# Patient Record
Sex: MALE | Race: BLACK OR AFRICAN AMERICAN | NOT HISPANIC OR LATINO | Employment: FULL TIME | URBAN - METROPOLITAN AREA
[De-identification: names, ages, dates, MRNs, and addresses within clinical notes are randomized per-mention and may not be internally consistent; named-entity substitution may affect disease eponyms.]

---

## 2023-02-22 LAB
ALANINE AMINOTRANSFERASE (SGPT) (U/L) IN SER/PLAS: 31 U/L (ref 10–52)
ALBUMIN (G/DL) IN SER/PLAS: 4.2 G/DL (ref 3.4–5)
ALKALINE PHOSPHATASE (U/L) IN SER/PLAS: 61 U/L (ref 33–136)
ANION GAP IN SER/PLAS: 14 MMOL/L (ref 10–20)
ASPARTATE AMINOTRANSFERASE (SGOT) (U/L) IN SER/PLAS: 24 U/L (ref 9–39)
BASOPHILS (10*3/UL) IN BLOOD BY AUTOMATED COUNT: 0.02 X10E9/L (ref 0–0.1)
BASOPHILS/100 LEUKOCYTES IN BLOOD BY AUTOMATED COUNT: 0.6 % (ref 0–2)
BILIRUBIN TOTAL (MG/DL) IN SER/PLAS: 0.3 MG/DL (ref 0–1.2)
CALCIUM (MG/DL) IN SER/PLAS: 8.8 MG/DL (ref 8.6–10.6)
CARBON DIOXIDE, TOTAL (MMOL/L) IN SER/PLAS: 22 MMOL/L (ref 21–32)
CD3+CD4+ ABSOLUTE: 0.84 X10E9/L (ref 0.35–2.74)
CD3+CD8+ ABSOLUTE: 0.61 X10E9/L (ref 0.08–1.49)
CD4/CD8 RATIO: 1.38 (ref 1–3.5)
CD45%: 100 %
CHLORIDE (MMOL/L) IN SER/PLAS: 109 MMOL/L (ref 98–107)
CHOLESTEROL (MG/DL) IN SER/PLAS: 181 MG/DL (ref 0–199)
CHOLESTEROL IN HDL (MG/DL) IN SER/PLAS: 36.3 MG/DL
CHOLESTEROL/HDL RATIO: 5
CP CD3+CD4+%: 51 % (ref 29–57)
CP CD3+CD8+%: 37 % (ref 7–31)
CREATININE (MG/DL) IN SER/PLAS: 1.48 MG/DL (ref 0.5–1.3)
EOSINOPHILS (10*3/UL) IN BLOOD BY AUTOMATED COUNT: 0.14 X10E9/L (ref 0–0.4)
EOSINOPHILS/100 LEUKOCYTES IN BLOOD BY AUTOMATED COUNT: 4.2 % (ref 0–6)
ERYTHROCYTE DISTRIBUTION WIDTH (RATIO) BY AUTOMATED COUNT: 14.8 % (ref 11.5–14.5)
ERYTHROCYTE MEAN CORPUSCULAR HEMOGLOBIN CONCENTRATION (G/DL) BY AUTOMATED: 30.2 G/DL (ref 32–36)
ERYTHROCYTE MEAN CORPUSCULAR VOLUME (FL) BY AUTOMATED COUNT: 86 FL (ref 80–100)
ERYTHROCYTES (10*6/UL) IN BLOOD BY AUTOMATED COUNT: 6.07 X10E12/L (ref 4.5–5.9)
FMETH: ABNORMAL
FSIT1: ABNORMAL
GFR MALE: 49 ML/MIN/1.73M2
GLUCOSE (MG/DL) IN SER/PLAS: 124 MG/DL (ref 74–99)
HEMATOCRIT (%) IN BLOOD BY AUTOMATED COUNT: 52.3 % (ref 41–52)
HEMOGLOBIN (G/DL) IN BLOOD: 15.8 G/DL (ref 13.5–17.5)
IMMATURE GRANULOCYTES/100 LEUKOCYTES IN BLOOD BY AUTOMATED COUNT: 0.3 % (ref 0–0.9)
LDL: 115 MG/DL (ref 0–99)
LEUKOCYTES (10*3/UL) IN BLOOD BY AUTOMATED COUNT: 3.4 X10E9/L (ref 4.4–11.3)
LYMPHOCYTES (10*3/UL) IN BLOOD BY AUTOMATED COUNT: 1.64 X10E9/L (ref 0.8–3)
LYMPHOCYTES/100 LEUKOCYTES IN BLOOD BY AUTOMATED COUNT: 48.8 % (ref 13–44)
MONOCYTES (10*3/UL) IN BLOOD BY AUTOMATED COUNT: 0.48 X10E9/L (ref 0.05–0.8)
MONOCYTES/100 LEUKOCYTES IN BLOOD BY AUTOMATED COUNT: 14.3 % (ref 2–10)
NEUTROPHILS (10*3/UL) IN BLOOD BY AUTOMATED COUNT: 1.07 X10E9/L (ref 1.6–5.5)
NEUTROPHILS/100 LEUKOCYTES IN BLOOD BY AUTOMATED COUNT: 31.8 % (ref 40–80)
NRBC (PER 100 WBCS) BY AUTOMATED COUNT: 0 /100 WBC (ref 0–0)
PLATELETS (10*3/UL) IN BLOOD AUTOMATED COUNT: 204 X10E9/L (ref 150–450)
POTASSIUM (MMOL/L) IN SER/PLAS: 4.4 MMOL/L (ref 3.5–5.3)
PROTEIN TOTAL: 7.2 G/DL (ref 6.4–8.2)
SODIUM (MMOL/L) IN SER/PLAS: 141 MMOL/L (ref 136–145)
SYPHILIS TOTAL AB: REACTIVE
TRIGLYCERIDE (MG/DL) IN SER/PLAS: 147 MG/DL (ref 0–149)
UREA NITROGEN (MG/DL) IN SER/PLAS: 20 MG/DL (ref 6–23)
VLDL: 29 MG/DL (ref 0–40)

## 2023-02-23 LAB
CHLAMYDIA TRACH., AMPLIFIED: NEGATIVE
HIV-1 RNA PCR VIRAL LOAD LOG: NORMAL LOG10 CPY/ML
HIV-1 RNA VIRAL LOAD: NOT DETECTED COPIES/ML
N. GONORRHEA, AMPLIFIED: NEGATIVE
VDRL: NONREACTIVE

## 2023-02-24 LAB
RPR: ABNORMAL
SYPHILIS INTEGRATED INTERPRETATION: ABNORMAL
SYPHILIS TOTAL AB: REACTIVE
TREPONEMA PALLIDUM CONFIRMATION: REACTIVE

## 2023-04-05 LAB — URINE CULTURE: NO GROWTH

## 2023-05-05 ENCOUNTER — HOSPITAL ENCOUNTER (OUTPATIENT)
Dept: DATA CONVERSION | Facility: HOSPITAL | Age: 73
End: 2023-05-05
Attending: INTERNAL MEDICINE | Admitting: INTERNAL MEDICINE
Payer: MEDICARE

## 2023-05-05 DIAGNOSIS — I47.20 VENTRICULAR TACHYCARDIA, UNSPECIFIED (MULTI): ICD-10-CM

## 2023-05-05 DIAGNOSIS — Z95.810 PRESENCE OF AUTOMATIC (IMPLANTABLE) CARDIAC DEFIBRILLATOR: ICD-10-CM

## 2023-05-05 DIAGNOSIS — I42.8 OTHER CARDIOMYOPATHIES (MULTI): ICD-10-CM

## 2023-05-05 DIAGNOSIS — Z45.02 ENCOUNTER FOR ADJUSTMENT AND MANAGEMENT OF AUTOMATIC IMPLANTABLE CARDIAC DEFIBRILLATOR: ICD-10-CM

## 2023-05-05 DIAGNOSIS — I50.9 HEART FAILURE, UNSPECIFIED (MULTI): ICD-10-CM

## 2023-05-05 LAB — POCT GLUCOSE: 72 MG/DL (ref 74–99)

## 2023-05-09 ENCOUNTER — APPOINTMENT (OUTPATIENT)
Dept: LAB | Facility: LAB | Age: 73
End: 2023-05-09
Payer: MEDICARE

## 2023-05-09 LAB
ERYTHROCYTE DISTRIBUTION WIDTH (RATIO) BY AUTOMATED COUNT: 14.9 % (ref 11.5–14.5)
ERYTHROCYTE MEAN CORPUSCULAR HEMOGLOBIN CONCENTRATION (G/DL) BY AUTOMATED: 31.5 G/DL (ref 32–36)
ERYTHROCYTE MEAN CORPUSCULAR VOLUME (FL) BY AUTOMATED COUNT: 85 FL (ref 80–100)
ERYTHROCYTES (10*6/UL) IN BLOOD BY AUTOMATED COUNT: 6.22 X10E12/L (ref 4.5–5.9)
HEMATOCRIT (%) IN BLOOD BY AUTOMATED COUNT: 53.1 % (ref 41–52)
HEMOGLOBIN (G/DL) IN BLOOD: 16.7 G/DL (ref 13.5–17.5)
LEUKOCYTES (10*3/UL) IN BLOOD BY AUTOMATED COUNT: 4.2 X10E9/L (ref 4.4–11.3)
NATRIURETIC PEPTIDE B (PG/ML) IN SER/PLAS: 94 PG/ML (ref 0–99)
NRBC (PER 100 WBCS) BY AUTOMATED COUNT: 0 /100 WBC (ref 0–0)
PLATELETS (10*3/UL) IN BLOOD AUTOMATED COUNT: 215 X10E9/L (ref 150–450)

## 2023-05-10 LAB
ALBUMIN (G/DL) IN SER/PLAS: 4.3 G/DL (ref 3.4–5)
ANION GAP IN SER/PLAS: 14 MMOL/L (ref 10–20)
CALCIUM (MG/DL) IN SER/PLAS: 9.9 MG/DL (ref 8.6–10.6)
CARBON DIOXIDE, TOTAL (MMOL/L) IN SER/PLAS: 25 MMOL/L (ref 21–32)
CHLORIDE (MMOL/L) IN SER/PLAS: 108 MMOL/L (ref 98–107)
CREATININE (MG/DL) IN SER/PLAS: 1.86 MG/DL (ref 0.5–1.3)
FERRITIN (UG/LL) IN SER/PLAS: 695 UG/L (ref 20–300)
GFR MALE: 38 ML/MIN/1.73M2
GLUCOSE (MG/DL) IN SER/PLAS: 116 MG/DL (ref 74–99)
IRON (UG/DL) IN SER/PLAS: 43 UG/DL (ref 35–150)
IRON BINDING CAPACITY (UG/DL) IN SER/PLAS: 304 UG/DL (ref 240–445)
IRON SATURATION (%) IN SER/PLAS: 14 % (ref 25–45)
PHOSPHATE (MG/DL) IN SER/PLAS: 3.9 MG/DL (ref 2.5–4.9)
POTASSIUM (MMOL/L) IN SER/PLAS: 4.6 MMOL/L (ref 3.5–5.3)
SODIUM (MMOL/L) IN SER/PLAS: 142 MMOL/L (ref 136–145)
UREA NITROGEN (MG/DL) IN SER/PLAS: 22 MG/DL (ref 6–23)

## 2023-05-11 LAB — SARS-COV-2 RESULT: NOT DETECTED

## 2023-07-24 ENCOUNTER — APPOINTMENT (OUTPATIENT)
Dept: LAB | Facility: LAB | Age: 73
End: 2023-07-24
Payer: MEDICARE

## 2023-07-24 LAB
ALANINE AMINOTRANSFERASE (SGPT) (U/L) IN SER/PLAS: 17 U/L (ref 10–52)
ALBUMIN (G/DL) IN SER/PLAS: 4.6 G/DL (ref 3.4–5)
ALBUMIN (MG/L) IN URINE: 1110.9 MG/L
ALBUMIN/CREATININE (UG/MG) IN URINE: 1313.1 UG/MG CRT (ref 0–30)
ALKALINE PHOSPHATASE (U/L) IN SER/PLAS: 61 U/L (ref 33–136)
ANION GAP IN SER/PLAS: 13 MMOL/L (ref 10–20)
ASPARTATE AMINOTRANSFERASE (SGOT) (U/L) IN SER/PLAS: 21 U/L (ref 9–39)
BILIRUBIN TOTAL (MG/DL) IN SER/PLAS: 0.4 MG/DL (ref 0–1.2)
C REACTIVE PROTEIN (MG/L) IN SER/PLAS: 0.13 MG/DL
CALCIUM (MG/DL) IN SER/PLAS: 10 MG/DL (ref 8.6–10.6)
CARBON DIOXIDE, TOTAL (MMOL/L) IN SER/PLAS: 25 MMOL/L (ref 21–32)
CHLORIDE (MMOL/L) IN SER/PLAS: 105 MMOL/L (ref 98–107)
CHOLESTEROL (MG/DL) IN SER/PLAS: 189 MG/DL (ref 0–199)
CHOLESTEROL IN HDL (MG/DL) IN SER/PLAS: 43.8 MG/DL
CHOLESTEROL/HDL RATIO: 4.3
COBALAMIN (VITAMIN B12) (PG/ML) IN SER/PLAS: 549 PG/ML (ref 211–911)
CREATININE (MG/DL) IN SER/PLAS: 1.21 MG/DL (ref 0.5–1.3)
CREATININE (MG/DL) IN URINE: 84.6 MG/DL (ref 20–370)
ERYTHROCYTE DISTRIBUTION WIDTH (RATIO) BY AUTOMATED COUNT: 14.3 % (ref 11.5–14.5)
ERYTHROCYTE MEAN CORPUSCULAR HEMOGLOBIN CONCENTRATION (G/DL) BY AUTOMATED: 30.9 G/DL (ref 32–36)
ERYTHROCYTE MEAN CORPUSCULAR VOLUME (FL) BY AUTOMATED COUNT: 88 FL (ref 80–100)
ERYTHROCYTES (10*6/UL) IN BLOOD BY AUTOMATED COUNT: 5.75 X10E12/L (ref 4.5–5.9)
ESTIMATED AVERAGE GLUCOSE FOR HBA1C: 169 MG/DL
FERRITIN (UG/LL) IN SER/PLAS: 630 UG/L (ref 20–300)
GFR MALE: 63 ML/MIN/1.73M2
GLUCOSE (MG/DL) IN SER/PLAS: 95 MG/DL (ref 74–99)
HEMATOCRIT (%) IN BLOOD BY AUTOMATED COUNT: 50.5 % (ref 41–52)
HEMOGLOBIN (G/DL) IN BLOOD: 15.6 G/DL (ref 13.5–17.5)
HEMOGLOBIN A1C/HEMOGLOBIN TOTAL IN BLOOD: 7.5 %
IRON (UG/DL) IN SER/PLAS: 73 UG/DL (ref 35–150)
IRON BINDING CAPACITY (UG/DL) IN SER/PLAS: 360 UG/DL (ref 240–445)
IRON SATURATION (%) IN SER/PLAS: 20 % (ref 25–45)
LDL: 109 MG/DL (ref 0–99)
LEUKOCYTES (10*3/UL) IN BLOOD BY AUTOMATED COUNT: 3.4 X10E9/L (ref 4.4–11.3)
NATRIURETIC PEPTIDE B (PG/ML) IN SER/PLAS: 58 PG/ML (ref 0–99)
NRBC (PER 100 WBCS) BY AUTOMATED COUNT: 0 /100 WBC (ref 0–0)
PHOSPHATE (MG/DL) IN SER/PLAS: 3.1 MG/DL (ref 2.5–4.9)
PLATELETS (10*3/UL) IN BLOOD AUTOMATED COUNT: 240 X10E9/L (ref 150–450)
POTASSIUM (MMOL/L) IN SER/PLAS: 4.8 MMOL/L (ref 3.5–5.3)
PROTEIN TOTAL: 7.5 G/DL (ref 6.4–8.2)
SEDIMENTATION RATE, ERYTHROCYTE: 16 MM/H (ref 0–20)
SODIUM (MMOL/L) IN SER/PLAS: 138 MMOL/L (ref 136–145)
THYROTROPIN (MIU/L) IN SER/PLAS BY DETECTION LIMIT <= 0.05 MIU/L: 2.43 MIU/L (ref 0.44–3.98)
TRIGLYCERIDE (MG/DL) IN SER/PLAS: 181 MG/DL (ref 0–149)
UREA NITROGEN (MG/DL) IN SER/PLAS: 17 MG/DL (ref 6–23)
VLDL: 36 MG/DL (ref 0–40)

## 2023-09-01 PROBLEM — E11.3293 TYPE 2 DIABETES MELLITUS WITH MILD NONPROLIFERATIVE RETINOPATHY OF BOTH EYES WITHOUT MACULAR EDEMA (MULTI): Status: ACTIVE | Noted: 2023-09-01

## 2023-09-01 PROBLEM — I47.20 PAROXYSMAL VT: Status: ACTIVE | Noted: 2023-09-01

## 2023-09-01 PROBLEM — H73.10: Status: ACTIVE | Noted: 2023-09-01

## 2023-09-01 PROBLEM — N52.9 MALE ERECTILE DISORDER: Status: ACTIVE | Noted: 2023-09-01

## 2023-09-01 PROBLEM — H90.A21 SENSORINEURAL HEARING LOSS (SNHL) OF RIGHT EAR WITH RESTRICTED HEARING OF LEFT EAR: Status: ACTIVE | Noted: 2023-09-01

## 2023-09-01 PROBLEM — J34.2 DEVIATED NASAL SEPTUM: Status: ACTIVE | Noted: 2023-09-01

## 2023-09-01 PROBLEM — E11.9 TYPE II DIABETES MELLITUS (MULTI): Status: ACTIVE | Noted: 2019-06-27

## 2023-09-01 PROBLEM — R20.0 NUMBNESS AND TINGLING IN BOTH HANDS: Status: ACTIVE | Noted: 2023-09-01

## 2023-09-01 PROBLEM — H25.13 AGE-RELATED NUCLEAR CATARACT OF BOTH EYES: Status: ACTIVE | Noted: 2023-09-01

## 2023-09-01 PROBLEM — M06.9 RA (RHEUMATOID ARTHRITIS) (MULTI): Status: ACTIVE | Noted: 2022-06-08

## 2023-09-01 PROBLEM — I50.22: Status: ACTIVE | Noted: 2023-09-01

## 2023-09-01 PROBLEM — I51.7 LVH (LEFT VENTRICULAR HYPERTROPHY): Status: ACTIVE | Noted: 2023-09-01

## 2023-09-01 PROBLEM — I47.29 PAROXYSMAL VT (MULTI): Status: ACTIVE | Noted: 2023-09-01

## 2023-09-01 PROBLEM — I10 HTN (HYPERTENSION): Status: ACTIVE | Noted: 2023-09-01

## 2023-09-01 PROBLEM — M70.60 GREATER TROCHANTERIC BURSITIS: Status: ACTIVE | Noted: 2023-09-01

## 2023-09-01 PROBLEM — H52.209 MYOPIA WITH ASTIGMATISM AND PRESBYOPIA: Status: ACTIVE | Noted: 2023-09-01

## 2023-09-01 PROBLEM — Q66.71 CAVUS DEFORMITY OF RIGHT FOOT: Status: ACTIVE | Noted: 2023-09-01

## 2023-09-01 PROBLEM — R39.89 ABNORMAL PROSTATE EXAM: Status: ACTIVE | Noted: 2023-09-01

## 2023-09-01 PROBLEM — R09.89 HEMODYNAMIC INSTABILITY: Status: ACTIVE | Noted: 2023-09-01

## 2023-09-01 PROBLEM — I25.10 ARTERIOSCLEROSIS OF CORONARY ARTERY: Status: ACTIVE | Noted: 2023-09-01

## 2023-09-01 PROBLEM — I25.5 GENERALIZED ISCHEMIC MYOCARDIAL DYSFUNCTION: Status: ACTIVE | Noted: 2023-09-01

## 2023-09-01 PROBLEM — D12.6 TUBULAR ADENOMA OF COLON: Status: ACTIVE | Noted: 2023-09-01

## 2023-09-01 PROBLEM — J34.3 HYPERTROPHY OF BOTH INFERIOR NASAL TURBINATES: Status: ACTIVE | Noted: 2023-09-01

## 2023-09-01 PROBLEM — E66.01 MORBID OBESITY (MULTI): Status: ACTIVE | Noted: 2023-09-01

## 2023-09-01 PROBLEM — R51.9 HEADACHE: Status: ACTIVE | Noted: 2023-09-01

## 2023-09-01 PROBLEM — H90.A32 MIXED CONDUCTIVE AND SENSORINEURAL HEARING LOSS OF LEFT EAR WITH RESTRICTED HEARING OF RIGHT EAR: Status: ACTIVE | Noted: 2023-09-01

## 2023-09-01 PROBLEM — R09.89 THROAT CLEARING: Status: ACTIVE | Noted: 2023-09-01

## 2023-09-01 PROBLEM — F41.8 DEPRESSION WITH ANXIETY: Status: ACTIVE | Noted: 2023-09-01

## 2023-09-01 PROBLEM — H25.11 AGE-RELATED NUCLEAR CATARACT OF RIGHT EYE: Status: ACTIVE | Noted: 2023-09-01

## 2023-09-01 PROBLEM — R39.198 OTHER DIFFICULTIES WITH MICTURITION: Status: ACTIVE | Noted: 2023-09-01

## 2023-09-01 PROBLEM — M54.12 CERVICAL RADICULITIS: Status: ACTIVE | Noted: 2023-09-01

## 2023-09-01 PROBLEM — I73.9 PAD (PERIPHERAL ARTERY DISEASE) (CMS-HCC): Status: ACTIVE | Noted: 2023-09-01

## 2023-09-01 PROBLEM — G89.29 PAIN, CHRONIC: Status: ACTIVE | Noted: 2022-06-08

## 2023-09-01 PROBLEM — E78.5 HYPERLIPIDEMIA: Status: ACTIVE | Noted: 2023-09-01

## 2023-09-01 PROBLEM — C61 PROSTATE CANCER (MULTI): Status: ACTIVE | Noted: 2023-09-01

## 2023-09-01 PROBLEM — T83.490A MALFUNCTION OF PENILE PROSTHESIS (CMS-HCC): Status: ACTIVE | Noted: 2023-09-01

## 2023-09-01 PROBLEM — R06.00 DYSPNEA: Status: ACTIVE | Noted: 2023-09-01

## 2023-09-01 PROBLEM — I70.213 ATHEROSCLEROSIS OF NATIVE ARTERY OF BOTH LOWER EXTREMITIES WITH INTERMITTENT CLAUDICATION (CMS-HCC): Status: ACTIVE | Noted: 2023-09-01

## 2023-09-01 PROBLEM — J30.9 ALLERGIC RHINITIS: Status: ACTIVE | Noted: 2023-09-01

## 2023-09-01 PROBLEM — K21.9 GASTROESOPHAGEAL REFLUX DISEASE: Status: ACTIVE | Noted: 2022-06-08

## 2023-09-01 PROBLEM — Z96.0 S/P INSERTION OF PENILE IMPLANT: Status: ACTIVE | Noted: 2023-09-01

## 2023-09-01 PROBLEM — I10 BENIGN ESSENTIAL HYPERTENSION: Status: ACTIVE | Noted: 2023-09-01

## 2023-09-01 PROBLEM — M54.16 LUMBAR RADICULITIS: Status: ACTIVE | Noted: 2023-09-01

## 2023-09-01 PROBLEM — M47.812 DJD (DEGENERATIVE JOINT DISEASE) OF CERVICAL SPINE: Status: ACTIVE | Noted: 2023-09-01

## 2023-09-01 PROBLEM — Z85.46 HISTORY OF MALIGNANT NEOPLASM OF PROSTATE: Status: ACTIVE | Noted: 2019-11-19

## 2023-09-01 PROBLEM — Z95.810 CARDIAC DEFIBRILLATOR IN PLACE: Status: ACTIVE | Noted: 2023-09-01

## 2023-09-01 PROBLEM — I47.29 VENTRICULAR TACHYCARDIA, NON-SUSTAINED (MULTI): Status: ACTIVE | Noted: 2023-09-01

## 2023-09-01 PROBLEM — I11.0 HYPERTENSIVE HEART DISEASE WITH HEART FAILURE (MULTI): Status: ACTIVE | Noted: 2019-11-19
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
## 2023-09-01 PROBLEM — Z86.79 HISTORY OF CARDIOMYOPATHY: Status: ACTIVE | Noted: 2023-09-01

## 2023-09-01 PROBLEM — Z98.61 CORONARY ANGIOPLASTY STATUS: Status: ACTIVE | Noted: 2023-09-01

## 2023-09-01 PROBLEM — B20: Status: ACTIVE | Noted: 2023-09-01

## 2023-09-01 PROBLEM — I48.3 TYPICAL ATRIAL FLUTTER (MULTI): Status: ACTIVE | Noted: 2023-09-01

## 2023-09-01 PROBLEM — H74.02 TYMPANOSCLEROSIS OF LEFT EAR: Status: ACTIVE | Noted: 2023-09-01

## 2023-09-01 PROBLEM — I42.9 CARDIOMYOPATHY (MULTI): Status: ACTIVE | Noted: 2023-09-01

## 2023-09-01 PROBLEM — M43.10 SPONDYLOLISTHESIS: Status: ACTIVE | Noted: 2023-09-01

## 2023-09-01 PROBLEM — H52.4 MYOPIA WITH ASTIGMATISM AND PRESBYOPIA: Status: ACTIVE | Noted: 2023-09-01

## 2023-09-01 PROBLEM — R31.0 GROSS HEMATURIA: Status: ACTIVE | Noted: 2023-09-01

## 2023-09-01 PROBLEM — M72.2 PLANTAR FASCIITIS, RIGHT: Status: ACTIVE | Noted: 2023-09-01

## 2023-09-01 PROBLEM — T83.89XA: Status: ACTIVE | Noted: 2023-09-01

## 2023-09-01 PROBLEM — E83.52 HYPERCALCEMIA: Status: ACTIVE | Noted: 2023-09-01

## 2023-09-01 PROBLEM — R20.2 NUMBNESS AND TINGLING IN BOTH HANDS: Status: ACTIVE | Noted: 2023-09-01

## 2023-09-01 PROBLEM — H52.10 MYOPIA WITH ASTIGMATISM AND PRESBYOPIA: Status: ACTIVE | Noted: 2023-09-01

## 2023-09-01 PROBLEM — H93.13 TINNITUS OF BOTH EARS: Status: ACTIVE | Noted: 2023-09-01

## 2023-09-01 PROBLEM — I49.5 SINOATRIAL NODE DYSFUNCTION (MULTI): Status: ACTIVE | Noted: 2023-09-01

## 2023-09-01 PROBLEM — S61.219A LACERATION OF FINGER: Status: ACTIVE | Noted: 2019-11-19

## 2023-09-01 PROBLEM — B20 HIV DISEASE (MULTI): Status: ACTIVE | Noted: 2023-09-01

## 2023-09-01 PROBLEM — I50.9 CONGESTIVE HEART FAILURE (MULTI): Status: ACTIVE | Noted: 2019-11-19

## 2023-09-01 PROBLEM — E11.3299 NPDR (NONPROLIFERATIVE DIABETIC RETINOPATHY) (MULTI): Status: ACTIVE | Noted: 2023-09-01

## 2023-09-01 PROBLEM — M79.18 MUSCULOSKELETAL PAIN: Status: ACTIVE | Noted: 2023-09-01

## 2023-09-01 PROBLEM — R32 URINARY INCONTINENCE: Status: ACTIVE | Noted: 2023-09-01

## 2023-09-01 PROBLEM — K63.5 POLYP OF COLON: Status: ACTIVE | Noted: 2023-09-01

## 2023-09-01 PROBLEM — Z96.0 STATUS POST IMPLANTATION OF ARTIFICIAL URINARY SPHINCTER: Status: ACTIVE | Noted: 2023-09-01

## 2023-09-01 PROBLEM — I48.91 ATRIAL FIBRILLATION (MULTI): Status: ACTIVE | Noted: 2019-11-19

## 2023-09-01 PROBLEM — M54.30 SCIATICA: Status: ACTIVE | Noted: 2023-09-01

## 2023-09-01 PROBLEM — E55.9 VITAMIN D DEFICIENCY: Status: ACTIVE | Noted: 2023-09-01

## 2023-09-01 PROBLEM — M65.4 DE QUERVAIN'S TENOSYNOVITIS: Status: ACTIVE | Noted: 2023-09-01

## 2023-09-01 PROBLEM — M48.062 LUMBAR STENOSIS WITH NEUROGENIC CLAUDICATION: Status: ACTIVE | Noted: 2023-09-01

## 2023-09-01 PROBLEM — I50.41 ACUTE COMBINED SYSTOLIC AND DIASTOLIC CONGESTIVE HEART FAILURE (MULTI): Status: ACTIVE | Noted: 2023-09-01

## 2023-09-01 PROBLEM — Z21 HUMAN IMMUNODEFICIENCY VIRUS (HIV) INFECTION: Status: ACTIVE | Noted: 2023-09-01

## 2023-09-01 RX ORDER — EPLERENONE 25 MG/1
1 TABLET, FILM COATED ORAL DAILY
COMMUNITY
Start: 2016-05-18 | End: 2023-11-03 | Stop reason: SDUPTHER

## 2023-09-01 RX ORDER — EZETIMIBE 10 MG/1
1 TABLET ORAL NIGHTLY
COMMUNITY
Start: 2021-11-10 | End: 2024-03-13 | Stop reason: SDUPTHER

## 2023-09-01 RX ORDER — OMEGA-3-ACID ETHYL ESTERS 1 G/1
1 CAPSULE, LIQUID FILLED ORAL DAILY
COMMUNITY
End: 2023-11-03 | Stop reason: SDUPTHER

## 2023-09-01 RX ORDER — HYDRALAZINE HYDROCHLORIDE 10 MG/1
TABLET, FILM COATED ORAL
COMMUNITY
Start: 2023-07-12 | End: 2023-11-03 | Stop reason: SDUPTHER

## 2023-09-01 RX ORDER — INSULIN GLARGINE 100 [IU]/ML
25 INJECTION, SOLUTION SUBCUTANEOUS NIGHTLY
COMMUNITY
End: 2024-03-13 | Stop reason: SDUPTHER

## 2023-09-01 RX ORDER — ESCITALOPRAM OXALATE 20 MG/1
1 TABLET ORAL DAILY
COMMUNITY
Start: 2019-11-12 | End: 2024-03-13 | Stop reason: ALTCHOICE

## 2023-09-01 RX ORDER — METOPROLOL SUCCINATE 200 MG/1
1 TABLET, EXTENDED RELEASE ORAL DAILY
COMMUNITY
Start: 2016-05-18 | End: 2023-11-21 | Stop reason: WASHOUT

## 2023-09-01 RX ORDER — TROSPIUM CHLORIDE ER 60 MG/1
1 CAPSULE ORAL DAILY
COMMUNITY
Start: 2023-07-18 | End: 2024-03-13 | Stop reason: ALTCHOICE

## 2023-09-01 RX ORDER — BLOOD SUGAR DIAGNOSTIC
1 STRIP MISCELLANEOUS 2 TIMES DAILY
COMMUNITY
Start: 2019-05-17

## 2023-09-01 RX ORDER — GLIPIZIDE 10 MG/1
10 TABLET ORAL
COMMUNITY
End: 2024-03-13 | Stop reason: ALTCHOICE

## 2023-09-01 RX ORDER — LANCETS 33 GAUGE
EACH MISCELLANEOUS
COMMUNITY
Start: 2023-06-17

## 2023-09-01 RX ORDER — ATORVASTATIN CALCIUM 40 MG/1
1 TABLET, FILM COATED ORAL DAILY
COMMUNITY
End: 2024-03-13 | Stop reason: WASHOUT

## 2023-09-01 RX ORDER — SERTRALINE HYDROCHLORIDE 50 MG/1
TABLET, FILM COATED ORAL
COMMUNITY
Start: 2020-10-01 | End: 2023-12-12 | Stop reason: WASHOUT

## 2023-09-01 RX ORDER — RIBOFLAVIN (VITAMIN B2) 400 MG
1 TABLET ORAL DAILY
COMMUNITY
End: 2023-11-03 | Stop reason: SDUPTHER

## 2023-09-01 RX ORDER — METOPROLOL SUCCINATE 25 MG/1
25 TABLET, EXTENDED RELEASE ORAL DAILY
COMMUNITY
End: 2023-11-21 | Stop reason: WASHOUT

## 2023-09-01 RX ORDER — PEN NEEDLE, DIABETIC 31 GX5/16"
NEEDLE, DISPOSABLE MISCELLANEOUS
COMMUNITY
Start: 2023-07-03

## 2023-09-01 RX ORDER — GABAPENTIN 100 MG/1
CAPSULE ORAL
COMMUNITY
Start: 2021-12-21 | End: 2024-03-13 | Stop reason: ALTCHOICE

## 2023-09-01 RX ORDER — SACUBITRIL AND VALSARTAN 49; 51 MG/1; MG/1
TABLET, FILM COATED ORAL
COMMUNITY
Start: 2022-10-10 | End: 2024-03-13 | Stop reason: ALTCHOICE

## 2023-09-01 RX ORDER — AMLODIPINE BESYLATE 10 MG/1
1 TABLET ORAL DAILY
COMMUNITY
Start: 2018-03-15 | End: 2024-03-13 | Stop reason: ALTCHOICE

## 2023-09-01 RX ORDER — APIXABAN 5 MG/1
1 TABLET, FILM COATED ORAL 2 TIMES DAILY
COMMUNITY
Start: 2023-06-28 | End: 2023-11-21 | Stop reason: WASHOUT

## 2023-09-01 RX ORDER — LISINOPRIL 20 MG/1
1 TABLET ORAL DAILY
COMMUNITY
End: 2023-11-21 | Stop reason: WASHOUT

## 2023-09-01 RX ORDER — METFORMIN HYDROCHLORIDE 500 MG/1
1 TABLET, EXTENDED RELEASE ORAL
COMMUNITY
Start: 2022-05-13 | End: 2024-03-13 | Stop reason: ALTCHOICE

## 2023-09-01 RX ORDER — GLIPIZIDE 5 MG/1
1 TABLET ORAL EVERY MORNING
COMMUNITY
End: 2024-03-13 | Stop reason: ALTCHOICE

## 2023-09-01 RX ORDER — AMLODIPINE BESYLATE 5 MG/1
1 TABLET ORAL DAILY
COMMUNITY
End: 2024-03-13 | Stop reason: ALTCHOICE

## 2023-09-01 RX ORDER — VIT C/E/ZN/COPPR/LUTEIN/ZEAXAN 250MG-90MG
1 CAPSULE ORAL DAILY
COMMUNITY
Start: 2018-06-11 | End: 2024-03-13 | Stop reason: ALTCHOICE

## 2023-09-01 RX ORDER — EMTRICITABINE AND TENOFOVIR ALAFENAMIDE 200; 25 MG/1; MG/1
1 TABLET ORAL DAILY
COMMUNITY
Start: 2018-06-11 | End: 2023-11-03 | Stop reason: SDUPTHER

## 2023-09-01 RX ORDER — CHOLECALCIFEROL (VITAMIN D3) 50 MCG
1 TABLET ORAL
COMMUNITY
End: 2024-03-13 | Stop reason: ALTCHOICE

## 2023-09-01 RX ORDER — ISOSORBIDE DINITRATE 10 MG/1
1 TABLET ORAL 3 TIMES DAILY
COMMUNITY
Start: 2023-07-12 | End: 2023-11-03 | Stop reason: SDUPTHER

## 2023-09-01 RX ORDER — CLOPIDOGREL BISULFATE 75 MG/1
1 TABLET ORAL DAILY
COMMUNITY
Start: 2015-10-06 | End: 2023-11-21 | Stop reason: SDUPTHER

## 2023-09-01 RX ORDER — SACUBITRIL AND VALSARTAN 97; 103 MG/1; MG/1
1 TABLET, FILM COATED ORAL 2 TIMES DAILY
COMMUNITY
Start: 2022-05-11 | End: 2023-11-21 | Stop reason: SDUPTHER

## 2023-09-01 RX ORDER — INSULIN GLARGINE 100 [IU]/ML
INJECTION, SOLUTION SUBCUTANEOUS
COMMUNITY
Start: 2020-04-24

## 2023-09-01 RX ORDER — ASPIRIN 81 MG/1
1 TABLET ORAL DAILY
COMMUNITY
Start: 2022-04-22 | End: 2024-03-13 | Stop reason: ALTCHOICE

## 2023-09-01 RX ORDER — EMPAGLIFLOZIN 10 MG/1
1 TABLET, FILM COATED ORAL DAILY
COMMUNITY
Start: 2022-04-22 | End: 2023-11-21 | Stop reason: WASHOUT

## 2023-09-01 RX ORDER — DOCUSATE SODIUM 100 MG/1
1 CAPSULE, LIQUID FILLED ORAL 2 TIMES DAILY
COMMUNITY
Start: 2019-01-04 | End: 2024-03-13 | Stop reason: ALTCHOICE

## 2023-09-13 LAB
ALANINE AMINOTRANSFERASE (SGPT) (U/L) IN SER/PLAS: 59 U/L (ref 10–52)
ALBUMIN (G/DL) IN SER/PLAS: 4.4 G/DL (ref 3.4–5)
ALKALINE PHOSPHATASE (U/L) IN SER/PLAS: 69 U/L (ref 33–136)
ANION GAP IN SER/PLAS: 14 MMOL/L (ref 10–20)
ASPARTATE AMINOTRANSFERASE (SGOT) (U/L) IN SER/PLAS: 31 U/L (ref 9–39)
BASOPHILS (10*3/UL) IN BLOOD BY AUTOMATED COUNT: 0.01 X10E9/L (ref 0–0.1)
BASOPHILS/100 LEUKOCYTES IN BLOOD BY AUTOMATED COUNT: 0.3 % (ref 0–2)
BILIRUBIN TOTAL (MG/DL) IN SER/PLAS: 0.4 MG/DL (ref 0–1.2)
CALCIUM (MG/DL) IN SER/PLAS: 9.9 MG/DL (ref 8.6–10.6)
CARBON DIOXIDE, TOTAL (MMOL/L) IN SER/PLAS: 26 MMOL/L (ref 21–32)
CHLORIDE (MMOL/L) IN SER/PLAS: 101 MMOL/L (ref 98–107)
CREATININE (MG/DL) IN SER/PLAS: 1.58 MG/DL (ref 0.5–1.3)
EOSINOPHILS (10*3/UL) IN BLOOD BY AUTOMATED COUNT: 0.06 X10E9/L (ref 0–0.4)
EOSINOPHILS/100 LEUKOCYTES IN BLOOD BY AUTOMATED COUNT: 1.8 % (ref 0–6)
ERYTHROCYTE DISTRIBUTION WIDTH (RATIO) BY AUTOMATED COUNT: 15 % (ref 11.5–14.5)
ERYTHROCYTE MEAN CORPUSCULAR HEMOGLOBIN CONCENTRATION (G/DL) BY AUTOMATED: 32.6 G/DL (ref 32–36)
ERYTHROCYTE MEAN CORPUSCULAR VOLUME (FL) BY AUTOMATED COUNT: 86 FL (ref 80–100)
ERYTHROCYTES (10*6/UL) IN BLOOD BY AUTOMATED COUNT: 6.12 X10E12/L (ref 4.5–5.9)
GFR MALE: 46 ML/MIN/1.73M2
GLUCOSE (MG/DL) IN SER/PLAS: 199 MG/DL (ref 74–99)
HEMATOCRIT (%) IN BLOOD BY AUTOMATED COUNT: 52.4 % (ref 41–52)
HEMOGLOBIN (G/DL) IN BLOOD: 17.1 G/DL (ref 13.5–17.5)
IMMATURE GRANULOCYTES/100 LEUKOCYTES IN BLOOD BY AUTOMATED COUNT: 0.3 % (ref 0–0.9)
LEUKOCYTES (10*3/UL) IN BLOOD BY AUTOMATED COUNT: 3.4 X10E9/L (ref 4.4–11.3)
LYMPHOCYTES (10*3/UL) IN BLOOD BY AUTOMATED COUNT: 2.09 X10E9/L (ref 0.8–3)
LYMPHOCYTES/100 LEUKOCYTES IN BLOOD BY AUTOMATED COUNT: 62.2 % (ref 13–44)
MONOCYTES (10*3/UL) IN BLOOD BY AUTOMATED COUNT: 0.29 X10E9/L (ref 0.05–0.8)
MONOCYTES/100 LEUKOCYTES IN BLOOD BY AUTOMATED COUNT: 8.6 % (ref 2–10)
NEUTROPHILS (10*3/UL) IN BLOOD BY AUTOMATED COUNT: 0.9 X10E9/L (ref 1.6–5.5)
NEUTROPHILS/100 LEUKOCYTES IN BLOOD BY AUTOMATED COUNT: 26.8 % (ref 40–80)
NRBC (PER 100 WBCS) BY AUTOMATED COUNT: 0 /100 WBC (ref 0–0)
PLATELETS (10*3/UL) IN BLOOD AUTOMATED COUNT: 218 X10E9/L (ref 150–450)
POTASSIUM (MMOL/L) IN SER/PLAS: 4.9 MMOL/L (ref 3.5–5.3)
PROTEIN TOTAL: 7.7 G/DL (ref 6.4–8.2)
SODIUM (MMOL/L) IN SER/PLAS: 136 MMOL/L (ref 136–145)
UREA NITROGEN (MG/DL) IN SER/PLAS: 20 MG/DL (ref 6–23)

## 2023-09-14 LAB
CD3+CD4+ ABSOLUTE: 1.02 X10E9/L (ref 0.35–2.74)
CD3+CD8+ ABSOLUTE: 0.75 X10E9/L (ref 0.08–1.49)
CD4/CD8 RATIO: 1.36 (ref 1–3.5)
CD45%: 100 %
CP CD3+CD4+%: 49 % (ref 29–57)
CP CD3+CD8+%: 36 % (ref 7–31)
FMETH: ABNORMAL
FSIT1: ABNORMAL
HIV-1 RNA PCR VIRAL LOAD LOG: ABNORMAL LOG10 CPY/ML
HIV-1 RNA VIRAL LOAD: ABNORMAL COPIES/ML

## 2023-09-14 NOTE — H&P
History of Present Illness:   History Present Illness:  Reason for surgery: Pulse Generator SERAFIN   HPI:    The patient is 72 years old male with past medical history significant for heart failure, nonischemic cardiomyopathy status post [place] dual-chamber ICD is here  for elective generator change out    Allergies:        Intolerances:  ·  ritonavir : Taste altered    Home Medication Review:   Home Medications Reviewed: yes     Impression/Procedure:   ·  Impression and Planned Procedure: Pulse generator SERAFIN, will proceed with elective generator change out       ERAS (Enhanced Recovery After Surgery):  ·  ERAS Patient: no       Physical Exam by System:    Constitutional: Well developed, awake/alert/oriented  x3, no distress, alert and cooperative   Respiratory/Thorax: Normal work of breathing   Cardiovascular: Regular rate and rhythm   Extremities: normal extremities, no cyanosis edema,  contusions or wounds, no clubbing   Neurological: alert and oriented x3   Psychological: Appropriate mood and behavior   Skin: Warm and dry, no lesions, no rashes     Airway/Sedation Assessment:  ·  Emotional Status calm   ·  Neurologic alert & oriented x 3   ·  Cardiovascular rhythm & rate regular   ·  GI/ soft, nontender     · Pulses present: Radial Left, Radial Right     ·  Mouth Opening OK yes   ·  Neck Flexibility OK yes   ·  Loose Teeth no   ·  Oropharyngeal Classification Class III   ·  ASA PS Classification ASA IV   ·  Sedation Plan moderate sedation     Consent:   COVID-19 Consent:  ·  COVID-19 Risk Consent Surgeon has reviewed key risks related to the risk of crystal COVID-19 and if they contract COVID-19 what the risks are.     Attestation:   Note Completion:  I am a:  Resident/Fellow   Attending Attestation I saw and evaluated the patient.  I personally obtained the key and critical portions of the history and physical exam or was physically present for key and  critical portions performed by the  resident/fellow. I reviewed the resident/fellow?s documentation and discussed the patient with the resident/fellow.  I agree with the resident/fellow?s medical decision making as documented in the note.     I personally evaluated the patient on 05-May-2023         Electronic Signatures:  Sahil Capellan)  (Signed 09-May-2023 09:58)   Authored: Note Completion   Co-Signer: History of Present Illness, Allergies, Home Medication Review, Impression/Procedure, ERAS, Physical Exam, Consent, Note Completion  Joon Kwok (Fellow))  (Signed 05-May-2023 14:22)   Authored: History of Present Illness, Allergies, Home  Medication Review, Impression/Procedure, ERAS, Physical Exam, Consent, Note Completion      Last Updated: 09-May-2023 09:58 by Sahil Capellan)
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
## 2023-10-13 DIAGNOSIS — B20 HUMAN IMMUNODEFICIENCY VIRUS (MULTI): Primary | ICD-10-CM

## 2023-10-19 DIAGNOSIS — I47.20 VT (VENTRICULAR TACHYCARDIA) (MULTI): Primary | ICD-10-CM

## 2023-10-19 DIAGNOSIS — Z95.810 PRESENCE OF AUTOMATIC CARDIOVERTER/DEFIBRILLATOR (AICD): ICD-10-CM

## 2023-10-20 ENCOUNTER — PHARMACY VISIT (OUTPATIENT)
Dept: PHARMACY | Facility: CLINIC | Age: 73
End: 2023-10-20
Payer: MEDICARE

## 2023-10-20 PROCEDURE — RXMED WILLOW AMBULATORY MEDICATION CHARGE

## 2023-10-24 ENCOUNTER — PHARMACY VISIT (OUTPATIENT)
Dept: PHARMACY | Facility: CLINIC | Age: 73
End: 2023-10-24
Payer: MEDICARE

## 2023-10-24 PROCEDURE — RXMED WILLOW AMBULATORY MEDICATION CHARGE

## 2023-11-01 ENCOUNTER — PHARMACY VISIT (OUTPATIENT)
Dept: PHARMACY | Facility: CLINIC | Age: 73
End: 2023-11-01
Payer: MEDICARE

## 2023-11-01 PROCEDURE — RXMED WILLOW AMBULATORY MEDICATION CHARGE

## 2023-11-03 ENCOUNTER — PHARMACY VISIT (OUTPATIENT)
Dept: PHARMACY | Facility: CLINIC | Age: 73
End: 2023-11-03
Payer: MEDICARE

## 2023-11-03 ENCOUNTER — OFFICE VISIT (OUTPATIENT)
Dept: NEPHROLOGY | Facility: CLINIC | Age: 73
End: 2023-11-03
Payer: MEDICARE

## 2023-11-03 VITALS
HEIGHT: 66 IN | TEMPERATURE: 97.4 F | DIASTOLIC BLOOD PRESSURE: 72 MMHG | BODY MASS INDEX: 34.55 KG/M2 | WEIGHT: 215 LBS | HEART RATE: 78 BPM | SYSTOLIC BLOOD PRESSURE: 109 MMHG

## 2023-11-03 DIAGNOSIS — R80.9 PROTEINURIA, UNSPECIFIED TYPE: ICD-10-CM

## 2023-11-03 DIAGNOSIS — I10 BENIGN ESSENTIAL HYPERTENSION: ICD-10-CM

## 2023-11-03 DIAGNOSIS — N18.31 TYPE 2 DIABETES MELLITUS WITH STAGE 3A CHRONIC KIDNEY DISEASE, UNSPECIFIED WHETHER LONG TERM INSULIN USE (MULTI): ICD-10-CM

## 2023-11-03 DIAGNOSIS — N18.30 STAGE 3 CHRONIC KIDNEY DISEASE, UNSPECIFIED WHETHER STAGE 3A OR 3B CKD (MULTI): Primary | ICD-10-CM

## 2023-11-03 DIAGNOSIS — I50.22 HEART FAILURE, SYSTOLIC, CHRONIC (MULTI): ICD-10-CM

## 2023-11-03 DIAGNOSIS — E11.22 TYPE 2 DIABETES MELLITUS WITH STAGE 3A CHRONIC KIDNEY DISEASE, UNSPECIFIED WHETHER LONG TERM INSULIN USE (MULTI): ICD-10-CM

## 2023-11-03 PROCEDURE — 3062F POS MACROALBUMINURIA REV: CPT | Performed by: NURSE PRACTITIONER

## 2023-11-03 PROCEDURE — 3008F BODY MASS INDEX DOCD: CPT | Performed by: NURSE PRACTITIONER

## 2023-11-03 PROCEDURE — 3078F DIAST BP <80 MM HG: CPT | Performed by: NURSE PRACTITIONER

## 2023-11-03 PROCEDURE — 3051F HG A1C>EQUAL 7.0%<8.0%: CPT | Performed by: NURSE PRACTITIONER

## 2023-11-03 PROCEDURE — 1159F MED LIST DOCD IN RCRD: CPT | Performed by: NURSE PRACTITIONER

## 2023-11-03 PROCEDURE — 99204 OFFICE O/P NEW MOD 45 MIN: CPT | Performed by: NURSE PRACTITIONER

## 2023-11-03 PROCEDURE — RXMED WILLOW AMBULATORY MEDICATION CHARGE

## 2023-11-03 PROCEDURE — 3074F SYST BP LT 130 MM HG: CPT | Performed by: NURSE PRACTITIONER

## 2023-11-03 PROCEDURE — 3066F NEPHROPATHY DOC TX: CPT | Performed by: NURSE PRACTITIONER

## 2023-11-03 PROCEDURE — 1036F TOBACCO NON-USER: CPT | Performed by: NURSE PRACTITIONER

## 2023-11-03 PROCEDURE — 1126F AMNT PAIN NOTED NONE PRSNT: CPT | Performed by: NURSE PRACTITIONER

## 2023-11-09 ENCOUNTER — PHARMACY VISIT (OUTPATIENT)
Dept: PHARMACY | Facility: CLINIC | Age: 73
End: 2023-11-09
Payer: MEDICARE

## 2023-11-09 ENCOUNTER — LAB (OUTPATIENT)
Dept: LAB | Facility: LAB | Age: 73
End: 2023-11-09
Payer: MEDICARE

## 2023-11-09 DIAGNOSIS — N18.30 STAGE 3 CHRONIC KIDNEY DISEASE, UNSPECIFIED WHETHER STAGE 3A OR 3B CKD (MULTI): ICD-10-CM

## 2023-11-09 LAB
25(OH)D3 SERPL-MCNC: 41 NG/ML (ref 30–100)
ANION GAP SERPL CALC-SCNC: 13 MMOL/L (ref 10–20)
BUN SERPL-MCNC: 27 MG/DL (ref 6–23)
CALCIUM SERPL-MCNC: 10.1 MG/DL (ref 8.6–10.6)
CHLORIDE SERPL-SCNC: 107 MMOL/L (ref 98–107)
CO2 SERPL-SCNC: 25 MMOL/L (ref 21–32)
CREAT SERPL-MCNC: 1.52 MG/DL (ref 0.5–1.3)
CREAT UR-MCNC: 68.2 MG/DL (ref 20–370)
GFR SERPL CREATININE-BSD FRML MDRD: 48 ML/MIN/1.73M*2
GLUCOSE SERPL-MCNC: 88 MG/DL (ref 74–99)
MICROALBUMIN UR-MCNC: 760.6 MG/L
MICROALBUMIN/CREAT UR: 1115.2 UG/MG CREAT
POTASSIUM SERPL-SCNC: 4.4 MMOL/L (ref 3.5–5.3)
PTH-INTACT SERPL-MCNC: 65.4 PG/ML (ref 18.5–88)
SODIUM SERPL-SCNC: 141 MMOL/L (ref 136–145)

## 2023-11-09 PROCEDURE — 36415 COLL VENOUS BLD VENIPUNCTURE: CPT

## 2023-11-09 PROCEDURE — 82043 UR ALBUMIN QUANTITATIVE: CPT

## 2023-11-09 PROCEDURE — RXMED WILLOW AMBULATORY MEDICATION CHARGE

## 2023-11-09 PROCEDURE — 83970 ASSAY OF PARATHORMONE: CPT

## 2023-11-09 PROCEDURE — 80048 BASIC METABOLIC PNL TOTAL CA: CPT

## 2023-11-09 PROCEDURE — 82306 VITAMIN D 25 HYDROXY: CPT

## 2023-11-09 PROCEDURE — 82570 ASSAY OF URINE CREATININE: CPT

## 2023-11-21 ENCOUNTER — PHARMACY VISIT (OUTPATIENT)
Dept: PHARMACY | Facility: CLINIC | Age: 73
End: 2023-11-21
Payer: MEDICARE

## 2023-11-21 ENCOUNTER — OFFICE VISIT (OUTPATIENT)
Dept: CARDIOLOGY | Facility: CLINIC | Age: 73
End: 2023-11-21
Payer: MEDICARE

## 2023-11-21 VITALS
HEART RATE: 66 BPM | DIASTOLIC BLOOD PRESSURE: 72 MMHG | SYSTOLIC BLOOD PRESSURE: 124 MMHG | WEIGHT: 212 LBS | OXYGEN SATURATION: 96 % | BODY MASS INDEX: 34.07 KG/M2 | HEIGHT: 66 IN

## 2023-11-21 DIAGNOSIS — I48.91 ATRIAL FIBRILLATION, UNSPECIFIED TYPE (MULTI): ICD-10-CM

## 2023-11-21 DIAGNOSIS — I50.22 HEART FAILURE, SYSTOLIC, CHRONIC (MULTI): Primary | ICD-10-CM

## 2023-11-21 DIAGNOSIS — R29.818 SUSPECTED SLEEP APNEA: ICD-10-CM

## 2023-11-21 PROCEDURE — 1036F TOBACCO NON-USER: CPT | Performed by: STUDENT IN AN ORGANIZED HEALTH CARE EDUCATION/TRAINING PROGRAM

## 2023-11-21 PROCEDURE — 3051F HG A1C>EQUAL 7.0%<8.0%: CPT | Performed by: STUDENT IN AN ORGANIZED HEALTH CARE EDUCATION/TRAINING PROGRAM

## 2023-11-21 PROCEDURE — 3078F DIAST BP <80 MM HG: CPT | Performed by: STUDENT IN AN ORGANIZED HEALTH CARE EDUCATION/TRAINING PROGRAM

## 2023-11-21 PROCEDURE — 1126F AMNT PAIN NOTED NONE PRSNT: CPT | Performed by: STUDENT IN AN ORGANIZED HEALTH CARE EDUCATION/TRAINING PROGRAM

## 2023-11-21 PROCEDURE — 3062F POS MACROALBUMINURIA REV: CPT | Performed by: STUDENT IN AN ORGANIZED HEALTH CARE EDUCATION/TRAINING PROGRAM

## 2023-11-21 PROCEDURE — RXMED WILLOW AMBULATORY MEDICATION CHARGE

## 2023-11-21 PROCEDURE — 3008F BODY MASS INDEX DOCD: CPT | Performed by: STUDENT IN AN ORGANIZED HEALTH CARE EDUCATION/TRAINING PROGRAM

## 2023-11-21 PROCEDURE — 99215 OFFICE O/P EST HI 40 MIN: CPT | Performed by: STUDENT IN AN ORGANIZED HEALTH CARE EDUCATION/TRAINING PROGRAM

## 2023-11-21 PROCEDURE — 1159F MED LIST DOCD IN RCRD: CPT | Performed by: STUDENT IN AN ORGANIZED HEALTH CARE EDUCATION/TRAINING PROGRAM

## 2023-11-21 PROCEDURE — 3066F NEPHROPATHY DOC TX: CPT | Performed by: STUDENT IN AN ORGANIZED HEALTH CARE EDUCATION/TRAINING PROGRAM

## 2023-11-21 PROCEDURE — 3074F SYST BP LT 130 MM HG: CPT | Performed by: STUDENT IN AN ORGANIZED HEALTH CARE EDUCATION/TRAINING PROGRAM

## 2023-11-21 RX ORDER — METOPROLOL SUCCINATE 200 MG/1
200 TABLET, EXTENDED RELEASE ORAL DAILY
Qty: 90 TABLET | Refills: 3 | Status: SHIPPED | OUTPATIENT
Start: 2023-11-21 | End: 2024-11-20

## 2023-11-21 RX ORDER — ISOSORBIDE DINITRATE 10 MG/1
10 TABLET ORAL 3 TIMES DAILY
Qty: 270 TABLET | Refills: 3 | Status: SHIPPED | OUTPATIENT
Start: 2023-11-21 | End: 2024-11-20

## 2023-11-21 RX ORDER — CLOPIDOGREL BISULFATE 75 MG/1
75 TABLET ORAL DAILY
Qty: 90 TABLET | Refills: 3 | Status: SHIPPED | OUTPATIENT
Start: 2023-11-21

## 2023-11-21 RX ORDER — EPLERENONE 25 MG/1
25 TABLET, FILM COATED ORAL DAILY
Qty: 90 TABLET | Refills: 3 | Status: SHIPPED | OUTPATIENT
Start: 2023-11-21 | End: 2024-11-20

## 2023-11-21 RX ORDER — EZETIMIBE 10 MG/1
10 TABLET ORAL NIGHTLY
Qty: 90 TABLET | Refills: 3 | Status: SHIPPED | OUTPATIENT
Start: 2023-11-21 | End: 2024-11-20

## 2023-11-21 RX ORDER — SACUBITRIL AND VALSARTAN 97; 103 MG/1; MG/1
1 TABLET, FILM COATED ORAL 2 TIMES DAILY
Qty: 180 TABLET | Refills: 3 | Status: SHIPPED | OUTPATIENT
Start: 2023-11-21

## 2023-11-21 RX ORDER — HYDRALAZINE HYDROCHLORIDE 10 MG/1
10 TABLET, FILM COATED ORAL 3 TIMES DAILY
Qty: 270 TABLET | Refills: 3 | Status: SHIPPED | OUTPATIENT
Start: 2023-11-21 | End: 2024-11-20

## 2023-11-21 ASSESSMENT — ENCOUNTER SYMPTOMS
WEIGHT LOSS: 0
PALPITATIONS: 0
HEMATEMESIS: 0
COLOR CHANGE: 0
HALLUCINATIONS: 1
HEMATURIA: 1
FEVER: 0
CLAUDICATION: 0
SPUTUM PRODUCTION: 0
HEMATOCHEZIA: 0
NEAR-SYNCOPE: 0
LOSS OF BALANCE: 0
ABDOMINAL PAIN: 1
SHORTNESS OF BREATH: 1
DYSPNEA ON EXERTION: 0
DIFFICULTY WITH CONCENTRATION: 1
DECREASED APPETITE: 0
SYNCOPE: 0
IRREGULAR HEARTBEAT: 0
WEIGHT GAIN: 0
ALTERED MENTAL STATUS: 0
COUGH: 0
ORTHOPNEA: 0
PND: 0
WHEEZING: 0
WEAKNESS: 0
FOCAL WEAKNESS: 0

## 2023-11-21 NOTE — PATIENT INSTRUCTIONS
Thank you for coming in today. If you have any questions or concerns, you may call the Heart Failure Office at [phone] option [phone], or [phone].  You may also contact our heart failure nursing team via email on [email or web address].    For quicker results set-up your  [facility] account to receive results and other correspondence directly to your phone.    Please bring all your pills/medications to your Cardiology appointments.    **  - No new medication changes today    -We will renew your heart medications today    - Please have the following tests done:  1.Blood tests before your next visit  (RFP, BNP, lipids)    2.  Echocardiogram before your next visit in 6 months.  We can schedule this for you today before you leave, or you may CALL  Tel [phone]   OR    [phone] to schedule      You will be referred to the following teams, CALL  [phone] to schedule your appointments with:  1. Sleep medicine. You may also CALL [phone] ([phone]) to  schedule with Sleep Medicine    - Please make an appointment to be seen in 6 to 7 months

## 2023-11-21 NOTE — PROGRESS NOTES
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  -Cardiac rehabilitation referral  -CPET in October 2023  -We will advance hydralazine/isosorbide dinitrate at next visit  -Heart failure lifestyle modifications discussed and Qs answered.     -Medication optimisation:  BB: Continue metoprolol succinate 200 mg once daily  RAASi: Continue sacubitril/valsartan to 97/103 mg twice daily  AA: Continue eplerenone 25 mg once daily  SGLT2i: Continue empagliflozin 10 mg once daily  Hydralazine: Continue 10 mg 3 times daily  Isosorbide dinitrate: Continue 10 mg 3 times daily    #Chest discomfort, Indeterminate nuclear pharmacological stress test 8/2022  -Evaluation has been negative for cardiac etiology, if he has continued symptoms after his neurological work-up, we will plan for myocardial PET rest stress study  -Continue clopidogrel  -Continue Ezetimibe    #Atrial flutter, new onset 2023 status post ablation 5/2023  -Continue to  hold aspirin  -Continue apixaban 5 mg twice daily  -Post ablation fatigue and mild SOB on exertion ,TTE 6/2023 reviewed    #Exertional leg pain -resolved  Will check PVR/MIKE if symptoms recur    #Iron deficiency  - IV  mg IV qweekly x 2 weeks -prescribed  -Encouraged today to increase iron content of diet, will recheck levels prior to next visit    #Suspected sleep apnea  Referred to the sleep medicine team again today    This note was transcribed using the Dragon Dictation system. There may be grammatical, punctuation, or verbiage errors that can occur with voice recognition programs.

## 2023-11-24 ENCOUNTER — APPOINTMENT (OUTPATIENT)
Dept: CARDIOLOGY | Facility: CLINIC | Age: 73
End: 2023-11-24
Payer: MEDICARE

## 2023-11-25 ENCOUNTER — PHARMACY VISIT (OUTPATIENT)
Dept: PHARMACY | Facility: CLINIC | Age: 73
End: 2023-11-25
Payer: MEDICARE

## 2023-11-25 PROCEDURE — RXMED WILLOW AMBULATORY MEDICATION CHARGE

## 2023-11-26 ASSESSMENT — ENCOUNTER SYMPTOMS
RESPIRATORY NEGATIVE: 1
EYES NEGATIVE: 1
CONSTITUTIONAL NEGATIVE: 1
PSYCHIATRIC NEGATIVE: 1
HEMATOLOGIC/LYMPHATIC NEGATIVE: 1
GASTROINTESTINAL NEGATIVE: 1
NEUROLOGICAL NEGATIVE: 1
ENDOCRINE NEGATIVE: 1
MUSCULOSKELETAL NEGATIVE: 1

## 2023-11-26 NOTE — PROGRESS NOTES
History Of Present Illness  Thompson Carranza is a 73 y.o. male with medical history significant for CKD, proteinuria, DMII, HTN, HFrEF, CAD s/p PCI to LAD (10/2021), s/p dual-chamber defibrillator placement, HLD, a-flutter s/p ablation (5/2023), HIV (on HAART), and prostate ca s/p RALP (2011) who presents for an initial evaluation for CKD and proteinuria.     Past Medical History  As above.    Surgical History  He has a past surgical history that includes Other surgical history (04/10/2014); Other surgical history (04/10/2014); Other surgical history (04/10/2014); Colonoscopy (04/10/2014); and Prostatectomy (11/14/2022).     Social History  He reports that he quit smoking about 15 years ago. His smoking use included cigarettes. He has never used smokeless tobacco. No history on file for alcohol use and drug use.    Family History  Family History   Problem Relation Name Age of Onset    Alcohol abuse Mother      Depression Mother      Diabetes Mother      Alcohol abuse Father      Stroke Father      Anxiety disorder Sister      Coronary artery disease Sister          multiple vessel    Diabetes Sister      Dementia Sister          senile    Dementia Brother      Dementia Paternal Cousin          Allergies  Peanut, Fluoride toothpaste, and Ritonavir    Review of Systems   Constitutional: Negative.    HENT: Negative.     Eyes: Negative.    Respiratory: Negative.     Cardiovascular:  Positive for leg swelling.        Chronic two-pillow orthopnea   Gastrointestinal: Negative.    Endocrine: Negative.    Genitourinary: Negative.    Musculoskeletal: Negative.    Skin: Negative.    Neurological: Negative.    Hematological: Negative.    Psychiatric/Behavioral: Negative.          Physical Exam  Constitutional:       Appearance: Normal appearance.   HENT:      Head: Normocephalic and atraumatic.      Mouth/Throat:      Mouth: Mucous membranes are moist.   Cardiovascular:      Rate and Rhythm: Normal rate and regular rhythm.       "Pulses: Normal pulses.      Heart sounds: Normal heart sounds.      Comments: Trace BLE edema, chronic  Pulmonary:      Effort: Pulmonary effort is normal.      Breath sounds: Normal breath sounds.   Musculoskeletal:         General: Normal range of motion.   Skin:     General: Skin is warm and dry.   Neurological:      General: No focal deficit present.      Mental Status: He is alert and oriented to person, place, and time.   Psychiatric:         Mood and Affect: Mood normal.         Behavior: Behavior normal.         Thought Content: Thought content normal.         Judgment: Judgment normal.          Last Recorded Vitals  Blood pressure 109/72, pulse 78, temperature 36.3 °C (97.4 °F), temperature source Temporal, height 1.676 m (5' 6\"), weight 97.5 kg (215 lb).    Relevant Results  Recent Results (from the past 3360 hour(s))   Phosphorus    Collection Time: 07/24/23  5:37 PM   Result Value Ref Range    Phosphorus 3.1 2.5 - 4.9 mg/dL   TSH with reflex to Free T4 if abnormal    Collection Time: 07/24/23  5:37 PM   Result Value Ref Range    TSH 2.43 0.44 - 3.98 mIU/L   Lipid Panel    Collection Time: 07/24/23  5:37 PM   Result Value Ref Range    Cholesterol 189 0 - 199 mg/dL    HDL 43.8 mg/dL    Cholesterol/HDL Ratio 4.3      (H) 0 - 99 mg/dL    VLDL 36 0 - 40 mg/dL    Triglycerides 181 (H) 0 - 149 mg/dL   Iron and TIBC    Collection Time: 07/24/23  5:37 PM   Result Value Ref Range    Iron 73 35 - 150 ug/dL    TIBC 360 240 - 445 ug/dL    Iron Saturation 20 (L) 25 - 45 %   Hemoglobin A1C    Collection Time: 07/24/23  5:37 PM   Result Value Ref Range    Hemoglobin A1C 7.5 (A) %    Estimated Average Glucose 169 MG/DL   Sedimentation Rate    Collection Time: 07/24/23  5:37 PM   Result Value Ref Range    Sedimentation Rate 16 0 - 20 mm/h   Albumin , Urine Random    Collection Time: 07/24/23  5:37 PM   Result Value Ref Range    ALBUMIN (MG/L) IN URINE 1,110.9 Not Established mg/L    Albumin/Creatine Ratio 1,313.1 " (H) 0.0 - 30.0 ug/mg crt    Creatinine, Urine 84.6 20.0 - 370.0 mg/dL   Ferritin    Collection Time: 07/24/23  5:37 PM   Result Value Ref Range    Ferritin 630 (H) 20 - 300 ug/L   C-Reactive Protein    Collection Time: 07/24/23  5:37 PM   Result Value Ref Range    CRP 0.13 mg/dL   B-Type Natriuretic Peptide    Collection Time: 07/24/23  5:37 PM   Result Value Ref Range    BNP 58 0 - 99 pg/mL   Vitamin B12    Collection Time: 07/24/23  5:37 PM   Result Value Ref Range    Vitamin B-12 549 211 - 911 pg/mL   Comprehensive Metabolic Panel    Collection Time: 07/24/23  5:37 PM   Result Value Ref Range    Glucose 95 74 - 99 mg/dL    Sodium 138 136 - 145 mmol/L    Potassium 4.8 3.5 - 5.3 mmol/L    Chloride 105 98 - 107 mmol/L    Bicarbonate 25 21 - 32 mmol/L    Anion Gap 13 10 - 20 mmol/L    Urea Nitrogen 17 6 - 23 mg/dL    Creatinine 1.21 0.50 - 1.30 mg/dL    GFR MALE 63 >90 mL/min/1.73m2    Calcium 10.0 8.6 - 10.6 mg/dL    Albumin 4.6 3.4 - 5.0 g/dL    Alkaline Phosphatase 61 33 - 136 U/L    Total Protein 7.5 6.4 - 8.2 g/dL    AST 21 9 - 39 U/L    Total Bilirubin 0.4 0.0 - 1.2 mg/dL    ALT (SGPT) 17 10 - 52 U/L   CBC    Collection Time: 07/24/23  5:37 PM   Result Value Ref Range    WBC 3.4 (L) 4.4 - 11.3 x10E9/L    nRBC 0.0 0.0 - 0.0 /100 WBC    RBC 5.75 4.50 - 5.90 x10E12/L    Hemoglobin 15.6 13.5 - 17.5 g/dL    Hematocrit 50.5 41.0 - 52.0 %    MCV 88 80 - 100 fL    MCHC 30.9 (L) 32.0 - 36.0 g/dL    Platelets 240 150 - 450 x10E9/L    RDW 14.3 11.5 - 14.5 %   Electrocardiogram    Collection Time: 08/15/23  9:09 AM   Result Value Ref Range    Ventricular Rate 62 BPM    Atrial Rate 62 BPM    WY Interval 180 ms    QRS Duration 152 ms    QT Interval 428 ms    QTC Calculation(Bazett) 434 ms    R Axis -61 degrees    T Axis -35 degrees    QRS Count 10 beats    Q Onset 229 ms    P Onset 139 ms    P Offset 180 ms    T Offset 443 ms    QTC Fredericia 432 ms   CD4/8    Collection Time: 09/13/23  1:47 PM   Result Value Ref Range     Site BLOOD     CD3+CD4+% 49 29 - 57 %    CD3+CD4+ Absolute 1.024 0.350 - 2.740 x10E9/L    CD3+CD8+% 36 (H) 7 - 31 %    CD3+CD8+ Absolute 0.752 0.080 - 1.490 x10E9/L    CD4/CD8 Ratio 1.36 1.00 - 3.50    CD45% 100 %    Method SEE BELOW    HIV RNA, quantitative, PCR    Collection Time: 09/13/23  1:47 PM   Result Value Ref Range    HIV-1 RNA Viral Load <Quantification (A) COPIES/mL    HIV-1 RNA PCR Viral Load Log NOT CALCULATED log10 cpy/mL   Comprehensive Metabolic Panel    Collection Time: 09/13/23  1:47 PM   Result Value Ref Range    Glucose 199 (H) 74 - 99 mg/dL    Sodium 136 136 - 145 mmol/L    Potassium 4.9 3.5 - 5.3 mmol/L    Chloride 101 98 - 107 mmol/L    Bicarbonate 26 21 - 32 mmol/L    Anion Gap 14 10 - 20 mmol/L    Urea Nitrogen 20 6 - 23 mg/dL    Creatinine 1.58 (H) 0.50 - 1.30 mg/dL    GFR MALE 46 (A) >90 mL/min/1.73m2    Calcium 9.9 8.6 - 10.6 mg/dL    Albumin 4.4 3.4 - 5.0 g/dL    Alkaline Phosphatase 69 33 - 136 U/L    Total Protein 7.7 6.4 - 8.2 g/dL    AST 31 9 - 39 U/L    Total Bilirubin 0.4 0.0 - 1.2 mg/dL    ALT (SGPT) 59 (H) 10 - 52 U/L   CBC and Auto Differential    Collection Time: 09/13/23  1:47 PM   Result Value Ref Range    WBC 3.4 (L) 4.4 - 11.3 x10E9/L    nRBC 0.0 0.0 - 0.0 /100 WBC    RBC 6.12 (H) 4.50 - 5.90 x10E12/L    Hemoglobin 17.1 13.5 - 17.5 g/dL    Hematocrit 52.4 (H) 41.0 - 52.0 %    MCV 86 80 - 100 fL    MCHC 32.6 32.0 - 36.0 g/dL    Platelets 218 150 - 450 x10E9/L    RDW 15.0 (H) 11.5 - 14.5 %    Neutrophils % 26.8 40.0 - 80.0 %    Immature Granulocytes %, Automated 0.3 0.0 - 0.9 %    Lymphocytes % 62.2 13.0 - 44.0 %    Monocytes % 8.6 2.0 - 10.0 %    Eosinophils % 1.8 0.0 - 6.0 %    Basophils % 0.3 0.0 - 2.0 %    Neutrophils Absolute 0.90 (L) 1.60 - 5.50 x10E9/L    Lymphocytes Absolute 2.09 0.80 - 3.00 x10E9/L    Monocytes Absolute 0.29 0.05 - 0.80 x10E9/L    Eosinophils Absolute 0.06 0.00 - 0.40 x10E9/L    Basophils Absolute 0.01 0.00 - 0.10 x10E9/L   Albumin , Urine Random     Collection Time: 11/09/23  3:50 PM   Result Value Ref Range    Albumin, Urine Random 760.6 Not established mg/L    Creatinine, Urine Random 68.2 20.0 - 370.0 mg/dL    Albumin/Creatine Ratio 1,115.2 (H) <30.0 ug/mg Creat   Vitamin D 25-Hydroxy,Total (for eval of Vitamin D levels)    Collection Time: 11/09/23  3:50 PM   Result Value Ref Range    Vitamin D, 25-Hydroxy, Total 41 30 - 100 ng/mL   Parathyroid Hormone, Intact    Collection Time: 11/09/23  3:50 PM   Result Value Ref Range    Parathyroid Hormone, Intact 65.4 18.5 - 88.0 pg/mL   Basic Metabolic Panel    Collection Time: 11/09/23  3:50 PM   Result Value Ref Range    Glucose 88 74 - 99 mg/dL    Sodium 141 136 - 145 mmol/L    Potassium 4.4 3.5 - 5.3 mmol/L    Chloride 107 98 - 107 mmol/L    Bicarbonate 25 21 - 32 mmol/L    Anion Gap 13 10 - 20 mmol/L    Urea Nitrogen 27 (H) 6 - 23 mg/dL    Creatinine 1.52 (H) 0.50 - 1.30 mg/dL    eGFR 48 (L) >60 mL/min/1.73m*2    Calcium 10.1 8.6 - 10.6 mg/dL         Assessment/Plan   73 y.o. male with medical history significant for CKD3, proteinuria, DMII, HTN, HFrEF, CAD s/p PCI to LAD (10/2021), s/p dual-chamber defibrillator placement, HLD, a-flutter s/p ablation (5/2023), HIV (on HAART), and prostate ca s/p RALP (2011) who presents for an initial evaluation for CKD and proteinuria.    # CKD3: baseline sCr 1.2 - 1.6 mg/dL with eGFR 46 - 63 ml/min/1.73m2. CT urography (12/29/22) showed simple small renal cysts otherwise unremarkable. Likely due to diabetic nephropathy, hypertensive nephropathy, and cardiorenal physiology. Most recent sCr 1.58 mg/dL (9/13/23) - at baseline. Currently stable.    # Proteinuria: random urine spot test done on 7/24/23 showed albumin 1110.9 mg/L in urine with alb/cr ratio of 1313.1 micrograms/mg, which is significantly worse than the test done on 4/25/23.    # DMII: > 30 years. Most recent HgbA1c 7.5% (7/24/23). Followed by endocrinology.    # HTN: > 30 years. Well controlled with current  regimen.    # HFrEF: EF ~40% (cMRI 2023). Followed by HF clinic.    Plan:  - Labs: BMP, PTH, Vit D; urine spot.  - Continue to follow up with endocrinology for optimal DMII management.  - Continue to follow up with cardiology for optimal HF management.  - Reviewed strategies for preserving remaining kidney function includin) Avoidance of NSAIDs including Aleve (Naprosyn), Motrin (Ibuprofen), Mobic (Meloxicam), Celebrex (Celecoxib) and aspirin as well as PPI acid blocking medications such as Prilosec (omeprazole), Protonix (pantoprazole), and Nexium (esomeprazole), as well as other nephrotoxic agents.   2) Avoidance of tobacco or alcohol use.   3) Adequate hydration daily.   4) Blood pressure target 130/80 mmHg.   5) Daily dietary sodium intake less than 2 grams per day.    6) Maintain healthy lifestyle. Healthy diet and regular exercises.   7) Maintain ideal weight.  - FUV: in 6 months or sooner if concerns arise.     Patient verbalized understanding of above. He will not hesitate to contact Division of Nephrology at [phone] with concerns/questions.    I spent 45 minutes in the professional and overall care of this patient.      Marcello Cano, APRN-CNP

## 2023-11-29 ENCOUNTER — HOSPITAL ENCOUNTER (OUTPATIENT)
Dept: CARDIOLOGY | Facility: CLINIC | Age: 73
Discharge: HOME | End: 2023-11-29
Payer: MEDICARE

## 2023-11-29 DIAGNOSIS — Z95.810 PRESENCE OF AUTOMATIC CARDIOVERTER/DEFIBRILLATOR (AICD): ICD-10-CM

## 2023-11-29 DIAGNOSIS — I47.20 VT (VENTRICULAR TACHYCARDIA) (MULTI): ICD-10-CM

## 2023-11-29 PROCEDURE — 93290 INTERROG DEV EVAL ICPMS IP: CPT | Performed by: INTERNAL MEDICINE

## 2023-11-29 PROCEDURE — 93283 PRGRMG EVAL IMPLANTABLE DFB: CPT

## 2023-11-29 PROCEDURE — 93283 PRGRMG EVAL IMPLANTABLE DFB: CPT | Performed by: INTERNAL MEDICINE

## 2023-12-04 ENCOUNTER — PHARMACY VISIT (OUTPATIENT)
Dept: PHARMACY | Facility: CLINIC | Age: 73
End: 2023-12-04
Payer: MEDICARE

## 2023-12-04 DIAGNOSIS — B20 HUMAN IMMUNODEFICIENCY VIRUS (MULTI): Primary | ICD-10-CM

## 2023-12-04 PROCEDURE — RXMED WILLOW AMBULATORY MEDICATION CHARGE

## 2023-12-05 ENCOUNTER — OFFICE VISIT (OUTPATIENT)
Dept: UROLOGY | Facility: CLINIC | Age: 73
End: 2023-12-05
Payer: MEDICARE

## 2023-12-05 VITALS
BODY MASS INDEX: 35.03 KG/M2 | DIASTOLIC BLOOD PRESSURE: 76 MMHG | HEART RATE: 60 BPM | HEIGHT: 66 IN | SYSTOLIC BLOOD PRESSURE: 121 MMHG | WEIGHT: 218 LBS

## 2023-12-05 DIAGNOSIS — R31.29 OTHER MICROSCOPIC HEMATURIA: ICD-10-CM

## 2023-12-05 DIAGNOSIS — R39.15 URINARY URGENCY: ICD-10-CM

## 2023-12-05 DIAGNOSIS — R32 URINARY INCONTINENCE, UNSPECIFIED TYPE: ICD-10-CM

## 2023-12-05 DIAGNOSIS — T83.89XD: ICD-10-CM

## 2023-12-05 DIAGNOSIS — R82.90 ABNORMAL URINALYSIS: Primary | ICD-10-CM

## 2023-12-05 LAB
MUCOUS THREADS #/AREA URNS AUTO: NORMAL /LPF
POC APPEARANCE, URINE: CLEAR
POC BILIRUBIN, URINE: NEGATIVE
POC BLOOD, URINE: ABNORMAL
POC COLOR, URINE: YELLOW
POC GLUCOSE, URINE: ABNORMAL MG/DL
POC KETONES, URINE: NEGATIVE MG/DL
POC LEUKOCYTES, URINE: NEGATIVE
POC NITRITE,URINE: NEGATIVE
POC PH, URINE: 5 PH
POC PROTEIN, URINE: ABNORMAL MG/DL
POC SPECIFIC GRAVITY, URINE: 1.02
POC UROBILINOGEN, URINE: 0.2 EU/DL
RBC #/AREA URNS AUTO: NORMAL /HPF
WBC #/AREA URNS AUTO: NORMAL /HPF

## 2023-12-05 PROCEDURE — 3074F SYST BP LT 130 MM HG: CPT | Performed by: STUDENT IN AN ORGANIZED HEALTH CARE EDUCATION/TRAINING PROGRAM

## 2023-12-05 PROCEDURE — 81001 URINALYSIS AUTO W/SCOPE: CPT

## 2023-12-05 PROCEDURE — 3008F BODY MASS INDEX DOCD: CPT | Performed by: STUDENT IN AN ORGANIZED HEALTH CARE EDUCATION/TRAINING PROGRAM

## 2023-12-05 PROCEDURE — 3066F NEPHROPATHY DOC TX: CPT | Performed by: STUDENT IN AN ORGANIZED HEALTH CARE EDUCATION/TRAINING PROGRAM

## 2023-12-05 PROCEDURE — 1159F MED LIST DOCD IN RCRD: CPT | Performed by: STUDENT IN AN ORGANIZED HEALTH CARE EDUCATION/TRAINING PROGRAM

## 2023-12-05 PROCEDURE — RXMED WILLOW AMBULATORY MEDICATION CHARGE

## 2023-12-05 PROCEDURE — 3051F HG A1C>EQUAL 7.0%<8.0%: CPT | Performed by: STUDENT IN AN ORGANIZED HEALTH CARE EDUCATION/TRAINING PROGRAM

## 2023-12-05 PROCEDURE — 3062F POS MACROALBUMINURIA REV: CPT | Performed by: STUDENT IN AN ORGANIZED HEALTH CARE EDUCATION/TRAINING PROGRAM

## 2023-12-05 PROCEDURE — 3078F DIAST BP <80 MM HG: CPT | Performed by: STUDENT IN AN ORGANIZED HEALTH CARE EDUCATION/TRAINING PROGRAM

## 2023-12-05 PROCEDURE — 99214 OFFICE O/P EST MOD 30 MIN: CPT | Performed by: STUDENT IN AN ORGANIZED HEALTH CARE EDUCATION/TRAINING PROGRAM

## 2023-12-05 PROCEDURE — 1036F TOBACCO NON-USER: CPT | Performed by: STUDENT IN AN ORGANIZED HEALTH CARE EDUCATION/TRAINING PROGRAM

## 2023-12-05 PROCEDURE — 1126F AMNT PAIN NOTED NONE PRSNT: CPT | Performed by: STUDENT IN AN ORGANIZED HEALTH CARE EDUCATION/TRAINING PROGRAM

## 2023-12-05 PROCEDURE — 1160F RVW MEDS BY RX/DR IN RCRD: CPT | Performed by: STUDENT IN AN ORGANIZED HEALTH CARE EDUCATION/TRAINING PROGRAM

## 2023-12-05 PROCEDURE — 81002 URINALYSIS NONAUTO W/O SCOPE: CPT | Performed by: STUDENT IN AN ORGANIZED HEALTH CARE EDUCATION/TRAINING PROGRAM

## 2023-12-05 RX ORDER — MIRABEGRON 50 MG/1
50 TABLET, EXTENDED RELEASE ORAL DAILY
Qty: 30 TABLET | Refills: 2 | Status: SHIPPED | OUTPATIENT
Start: 2023-12-05

## 2023-12-05 ASSESSMENT — PAIN SCALES - GENERAL: PAINLEVEL: 0-NO PAIN

## 2023-12-05 NOTE — PROGRESS NOTES
Thompson presents for a follow up visit.  The patient’s EMR has been reviewed.  Lives in [place], [place]     H/o HIV, CAD s/p PCI (2021), HFrEF (40% EF preop), T2DM, PAD, prostate Ca s/p RALP (2011) leading to incontinence and ED managed with IPP (2014) and AUS (placed 2012, reactivation in 2014, removal and replacement 2019).     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  S/p explant and re-implant of AUS with me on (05/11/23).  AUS successfully activated (06/26/23).    Developed an episode of A-flutter postoperatively.  Successfully radio-ablated (05/16/23)  Continues to f/u with EP for this.  Continue apixaban for anticoagulation.  Urethral Salcedo removed prior to d/c.    SUBJECTIVE: HPI   TODAY (12/05/23)  Presents today for 6 months follow up visit.   S/p AUS activation (06/26/23).   States that he has persistent post-void dribbling.  Has to wear up to 3x thin pads 2/2 this.   C/o some urinary urgency as well.  Unable to make it to the restroom in time.   Opted to discontinue Trospium 2/2 no benefit.   Has not tried myrbetriq in the past.     TO REVIEW: Last evaluation (06/26/23)  Presents for AUS activation.  Denies any acute or worsening urinary complaints.  Denies any recent UTIs, gross hematuria, flank pain, fevers or chills.     TO REVIEW:   Patient reports the swelling has improved.  Noted some mild bleeding from incision site.   Otherwise no penile discharge, and pain well-controlled.  Denies any fevers, chills.     Past medical, surgical, family and social history in the chart was reviewed and is accurate including any additions to what is in this HPI.    Review of Systems   Constitutional: denies any unintentional weight loss or change in strength.  Integumentary: denies any rashes or pruritus.  Eyes: denies any double vision or eye pain.  Ear/Nose/Mouth/Throat: denies any nosebleeds or gum bleeds.  Cardiovascular: denies any chest pain or syncope.  Respiratory: denies hemoptysis.  Gastrointestinal:  denies nausea or vomiting.  Musculoskeletal: denies muscle cramping or weakness.  Neurologic: denies convulsions or seizures.  Hematologic/Lymphatic: denies bleeding tendencies.  Endocrine: denies heat/cold intolerance.  All other systems have been reviewed and are negative unless otherwise noted in the HPI.    OBJECTIVE:  There were no vitals taken for this visit.  Physical Exam   Constitutional: No obvious distress.  Eyes: Non-injected conjunctiva, sclera clear, EOMI.  Ears/Nose/Mouth/Throat: No obvious drainage per ears or nose.  Cardiovascular: Extremities are warm and well perfused. No edema, cyanosis or pallor.  Respiratory: No audible wheezing/stridor; respirations do not appear labored.  Gastrointestinal: Abdomen soft, not distended.  Musculoskeletal: Normal ROM of extremities.  Skin: No obvious rashes or open sores.  Neurologic: Alert and oriented, CN 2-12 grossly intact.  Psychiatric: Answers questions appropriately with normal affect.  Hematologic/Lymphatic/Immunologic: No obvious bruises or sites of spontaneous bleeding.  Genitourinary: No CVA tenderness, bladder not palpable.     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 H/O Prostate CA - s/p RALP (2011)  2. H/O Urinary incontinence  3. AUS placement   4. HIV  5. CAD  6. HFrEF  7. T2DM    PLAN:  S/p AUS explant and re-implant with me (05/11/23)  AUS activated (06/26/23)    Reports persistent post-void dribbling and urinary urgency associated leakage today.   No benefit with Trospium. Since discontinued.  Continues to wear daily pads, thin pads 3x daily   Opts to trial course of Myrbetriq.   Risks, benefits and alternatives discussed.   RTC in 6-8 weeks for med check.     If no improvement with myrbetriq.   Can consider UDS testing for further evaluation.     All questions were answered to the patient’s satisfaction.  Patient agrees with the plan and wishes to proceed.    Scribed for Dr. Fransico Diallo by Natanael Aldrich.  I, Dr. Fransico Diallo have personally reviewed and agreed with the information entered by the Virtual Scribe. 12/05/23.

## 2023-12-06 ENCOUNTER — PHARMACY VISIT (OUTPATIENT)
Dept: PHARMACY | Facility: CLINIC | Age: 73
End: 2023-12-06
Payer: MEDICARE

## 2023-12-07 PROCEDURE — RXMED WILLOW AMBULATORY MEDICATION CHARGE

## 2023-12-07 RX ORDER — EMTRICITABINE AND TENOFOVIR ALAFENAMIDE 200; 25 MG/1; MG/1
1 TABLET ORAL DAILY
Qty: 30 TABLET | Refills: 5 | Status: SHIPPED | OUTPATIENT
Start: 2023-12-07 | End: 2024-05-21 | Stop reason: SDUPTHER

## 2023-12-11 ENCOUNTER — APPOINTMENT (OUTPATIENT)
Dept: SLEEP MEDICINE | Facility: CLINIC | Age: 73
End: 2023-12-11
Payer: MEDICARE

## 2023-12-11 NOTE — PROGRESS NOTES
[facility]  New Visit Note        HISTORY OF PRESENT ILLNESS     The patient's referring provider is: Alma Mendez MD*    HISTORY OF PRESENT ILLNESS   Thompson Carranza is a 73 y.o. male who presents to a [facility] for a sleep medicine evaluation with concerns of No chief complaint on file..     PAST SLEEP HISTORY    Patient has the following sleep-related diagnoses and sleep study results: ***    CURRENT HISTORY    On today's visit, the patient reports ***    STOP  ***  BANG ***    Sleep schedule  on weekdays / work days:  Usual Bedtime  ***  Falls asleep around  ***  Wake time  ***    Sleep schedule  on weekends/non work days :  Usual Bedtime  ***  Falls asleep around ***  Wake time  ***    Naps:   ***    Average sleep duration *** hours/day    Preferred sleeping position: ***    Sleep-related ROS:    Sleep Initiation: {Sleep initiation:83224}    Sleep Maintenance: {Sleep Maintenance:17171}    Breathing during sleep: {Breathing during sleep:64849}    RLS screen:  ***    Sleep-related behaviors:  ***    Daytime Symptoms  On awakening patient reports: ***    Patient report some daytime symptoms including: ***    Recreational drug use  Caffeine consumption:  ***  Alcohol consumption: ***  Smoking: ***  Marijuana: ***    ESS: No data recorded   COREY: ***  FOSQ:  ***      REVIEW OF SYSTEMS     All other systems negative      ALLERGIES AND MEDICATIONS     ALLERGIES  Allergies   Allergen Reactions    Peanut Hives    Fluoride Toothpaste Unknown     toothpaste    Ritonavir Other     Metallic taste       MEDICATIONS  Current Outpatient Medications   Medication Sig Dispense Refill    amLODIPine (Norvasc) 10 mg tablet Take 1 tablet (10 mg) by mouth once daily.      amLODIPine (Norvasc) 5 mg tablet Take 1 tablet (5 mg) by mouth once daily.      apixaban (Eliquis) 5 mg tablet TAKE 1 TABLET BY MOUTH TWO TIMES A  tablet 3    aspirin 81 mg EC tablet Take  "1 tablet (81 mg) by mouth once daily.      atorvastatin (Lipitor) 40 mg tablet Take 1 tablet (40 mg) by mouth once daily.      Basaglar KwikPen U-100 Insulin 100 unit/mL (3 mL) pen Inject under the skin. INJECT 16 UNITS UNDER THE SKIN IN THE MORNING AND INJECT 24 UNITS IN THE EVENING.      BD Ultra-Fine Short Pen Needle 31 gauge x 5/16\" needle       blood sugar diagnostic strip TEST BLOOD GLUCOSE TWO TIMES A  each 2    cholecalciferol (Vitamin D-3) 25 MCG (1000 UT) capsule Take 1 capsule (25 mcg) by mouth once daily.      cholecalciferol (Vitamin D-3) 50 MCG (2000 UT) tablet Take 1 tablet (2,000 Units) by mouth 1 (one) time per week.      clopidogrel (Plavix) 75 mg tablet Take 1 tablet (75 mg) by mouth once daily. 90 tablet 3    docusate sodium (Colace) 100 mg capsule Take 1 capsule (100 mg) by mouth twice a day.      dolutegravir (Tivicay) 50 mg tablet TAKE 1 TABLET BY MOUTH DAILY 30 tablet 5    dulaglutide (Trulicity) 1.5 mg/0.5 mL pen injector injection Inject 1.5 mg under the skin 1 (one) time per week.      empagliflozin (Jardiance) 10 mg TAKE 1 TABLET BY MOUTH DAILY 90 tablet 3    emtricitabine-tenofovir alafen (Descovy) 200-25 mg tablet TAKE 1 TABLET BY MOUTH DAILY 30 tablet 5    Entresto 49-51 mg tablet       Entresto  mg tablet Take 1 tablet by mouth 2 times a day. 180 tablet 3    eplerenone (Inspra) 25 mg tablet TAKE 1 TABLET BY MOUTH ONCE DAILY 90 tablet 3    escitalopram (Lexapro) 20 mg tablet Take 1 tablet (20 mg) by mouth once daily.      ezetimibe (Zetia) 10 mg tablet Take 1 tablet (10 mg) by mouth once daily at bedtime.      ezetimibe (Zetia) 10 mg tablet TAKE 1 TABLET BY MOUTH ONCE DAILY AT BEDTIME 90 tablet 3    flash glucose sensor (FREESTYLE EVAN 2 SENSOR [facility]) every 14 (fourteen) days. Change sensor      FreeStyle Evan reader misc USE READER TO CHECK YOUR BLOOD GLUCOSE 4 X DAY (Patient not taking: Reported on 11/21/2023) 1 each 0    FreeStyle Evan sensor system kit CHANGE SENSOR " "EVERY 14 DAYS AS DIRECTED (Patient not taking: Reported on 11/21/2023) 2 each 4    gabapentin (Neurontin) 100 mg capsule Take by mouth. Take 1 capsule every morning with breakfast and 3 capsules at bedtime.      glipiZIDE (Glucotrol) 10 mg tablet Take 1 tablet (10 mg) by mouth 2 times a day before meals.      glipiZIDE (Glucotrol) 5 mg tablet Take 1 tablet (5 mg) by mouth once daily in the morning.      hydrALAZINE (Apresoline) 10 mg tablet TAKE 1 TABLET BY MOUTH THREE TIMES A DAY (Patient not taking: Reported on 12/5/2023) 270 tablet 3    insulin glargine (Lantus U-100 Insulin) 100 unit/mL injection Inject 25 Units under the skin once daily at bedtime.      insulin glargine (Lantus) 100 unit/mL (3 mL) pen INJECT 16 UNITS UNDER THE SKIN IN THE MORNING AND INJECT 24 UNITS IN THE EVENING. 45 mL 2    isosorbide dinitrate (Isordil) 10 mg tablet TAKE 1 TABLET BY MOUTH THREE TIMES A  tablet 3    lancets 33 gauge misc TEST BLOOD SUGAR TWICE A DAY AS DIRECTED 200 each 2    metFORMIN  mg 24 hr tablet Take 1 tablet (500 mg) by mouth 2 times a day with meals. with breakfast and dinner.      metoprolol succinate XL (Toprol-XL) 200 mg 24 hr tablet TAKE 1 TABLET BY MOUTH ONCE DAILY 90 tablet 3    mirabegron (Myrbetriq) 50 mg tablet extended release 24 hr 24 hr tablet Take 1 tablet (50 mg) by mouth once daily. 30 tablet 2    omega-3 acid ethyl esters (Lovaza) 1 gram capsule TAKE 1 CAPSULE BY MOUTH ONCE DAILY. (Patient not taking: Reported on 11/21/2023) 30 capsule 1    OneTouch Delica Plus Lancet 33 gauge misc       OneTouch Ultra Test strip 1 strip 2 times a day. TEST BLOOD GLUCOSE      pen needle, diabetic 31 gauge x 5/16\" needle USE AS DIRECTED TWO TIMES A  each 3    riboflavin (Vitamin B-2) 400 mg tablet TAKE ONE TABLET BY MOUTH DAILY WITH FOOD 30 tablet 3    sertraline (Zoloft) 50 mg tablet Take by mouth. TAKE 2 tab in the am, 2 TABLETS BY MOUTH in the evening daily WITH FOOD.      trospium (Sanctura XR) " 60 mg 24 hour capsule Take 1 capsule (60 mg) by mouth once daily.       No current facility-administered medications for this visit.         PAST HISTORY     PAST MEDICAL HISTORY  He  has a past medical history of Disease of salivary gland, unspecified, Lipomatosis, not elsewhere classified, Nasal congestion (09/11/2015), Other conditions influencing health status (11/26/2018), Other conditions influencing health status (10/31/2013), Other disturbances of smell and taste (05/14/2019), Other muscle spasm, Other specified dermatitis, Other symptoms and signs involving general sensations and perceptions (12/21/2015), Personal history of diseases of the skin and subcutaneous tissue, Personal history of diseases of the skin and subcutaneous tissue, Personal history of diseases of the skin and subcutaneous tissue, Personal history of other diseases of male genital organs (04/20/2017), Personal history of other diseases of the nervous system and sense organs, Personal history of other diseases of the respiratory system, Personal history of other diseases of the respiratory system (02/16/2016), Personal history of other diseases of the respiratory system (05/08/2019), Personal history of other diseases of the respiratory system (02/03/2020), Personal history of other diseases of the respiratory system (05/08/2015), Personal history of other diseases of the respiratory system (07/29/2017), Personal history of other diseases of urinary system, Personal history of other diseases of urinary system, Personal history of other endocrine, nutritional and metabolic disease, Personal history of other infectious and parasitic diseases, Personal history of other infectious and parasitic diseases, Personal history of other infectious and parasitic diseases, Personal history of other specified conditions, Personal history of other specified conditions (11/06/2017), Personal history of other specified conditions (03/24/2017), Personal  history of other specified conditions (12/07/2021), Personal history of pneumonia (recurrent), Personal history of pneumonia (recurrent), Personal history of urinary (tract) infections, Spondylosis without myelopathy or radiculopathy, 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    PAST SURGICAL HISTORY:  Past Surgical History:   Procedure Laterality Date    COLONOSCOPY  04/10/2014    Complete Colonoscopy    OTHER SURGICAL HISTORY  04/10/2014    Biopsy Skin    OTHER SURGICAL HISTORY  04/10/2014    Biopsy Of The Prostate Incisional    OTHER SURGICAL HISTORY  04/10/2014    Biopsy Bone Marrow    PROSTATECTOMY  11/14/2022    Prostatectomy Radical       FAMILY HISTORY  Family History   Problem Relation Name Age of Onset    Alcohol abuse Mother      Depression Mother      Diabetes Mother      Alcohol abuse Father      Stroke Father      Anxiety disorder Sister      Coronary artery disease Sister          multiple vessel    Diabetes Sister      Dementia Sister          senile    Dementia Brother      Dementia Paternal Cousin         SOCIAL HISTORY  He  reports that he quit smoking about 15 years ago. His smoking use included cigarettes. He has never used smokeless tobacco. No history on file for alcohol use and drug use.       PHYSICAL EXAM     VITAL SIGNS: There were no vitals taken for this visit.     CURRENT WEIGHT: [unfilled]  BMI: [unfilled]  PREVIOUS WEIGHTS:  Wt Readings from Last 3 Encounters:   12/05/23 98.9 kg (218 lb)   11/21/23 96.2 kg (212 lb)   11/03/23 97.5 kg (215 lb)       PHYSICAL EXAM: GENERAL: alert oriented x 3 pleasant and cooperative no acute distress  MODIFIED HAWKINS SCORE:   MODIFIED MALLAMPATI SCORE:   LATERAL PHARYNGEAL WALL:   TONSILLAR SCORE:   UVULA: midline  TONGUE SCALLOPING: normal  NECK EXAM: normal  supple no adenopathy    RESULTS/DATA     Iron (ug/dL)   Date Value   07/24/2023 73   05/09/2023 43   12/29/2022 55     Iron Saturation (%)   Date Value   07/24/2023 20 (L)   05/09/2023 14 (L)   12/29/2022 14 (L)     TIBC (ug/dL)   Date Value   07/24/2023 360   05/09/2023 304   12/29/2022 387     Ferritin (ug/L)   Date Value   07/24/2023 630 (H)   05/09/2023 695 (H)   12/29/2022 41       ASSESSMENT/PLAN     Mr. Carranza is a 73 y.o. male and He was referred to the [facility] for the following issues:

## 2023-12-12 ENCOUNTER — OFFICE VISIT (OUTPATIENT)
Dept: SLEEP MEDICINE | Facility: CLINIC | Age: 73
End: 2023-12-12
Payer: MEDICARE

## 2023-12-12 VITALS
WEIGHT: 218 LBS | DIASTOLIC BLOOD PRESSURE: 87 MMHG | BODY MASS INDEX: 33.04 KG/M2 | TEMPERATURE: 98.2 F | SYSTOLIC BLOOD PRESSURE: 148 MMHG | HEART RATE: 60 BPM | HEIGHT: 68 IN

## 2023-12-12 DIAGNOSIS — F41.9 ANXIETY AND DEPRESSION: ICD-10-CM

## 2023-12-12 DIAGNOSIS — R29.818 SUSPECTED SLEEP APNEA: Primary | ICD-10-CM

## 2023-12-12 DIAGNOSIS — F32.A ANXIETY AND DEPRESSION: ICD-10-CM

## 2023-12-12 PROCEDURE — 3066F NEPHROPATHY DOC TX: CPT | Performed by: PHYSICIAN ASSISTANT

## 2023-12-12 PROCEDURE — 1159F MED LIST DOCD IN RCRD: CPT | Performed by: PHYSICIAN ASSISTANT

## 2023-12-12 PROCEDURE — 3008F BODY MASS INDEX DOCD: CPT | Performed by: PHYSICIAN ASSISTANT

## 2023-12-12 PROCEDURE — 3062F POS MACROALBUMINURIA REV: CPT | Performed by: PHYSICIAN ASSISTANT

## 2023-12-12 PROCEDURE — RXMED WILLOW AMBULATORY MEDICATION CHARGE

## 2023-12-12 PROCEDURE — 99204 OFFICE O/P NEW MOD 45 MIN: CPT | Performed by: PHYSICIAN ASSISTANT

## 2023-12-12 PROCEDURE — 1036F TOBACCO NON-USER: CPT | Performed by: PHYSICIAN ASSISTANT

## 2023-12-12 PROCEDURE — 3077F SYST BP >= 140 MM HG: CPT | Performed by: PHYSICIAN ASSISTANT

## 2023-12-12 PROCEDURE — 3051F HG A1C>EQUAL 7.0%<8.0%: CPT | Performed by: PHYSICIAN ASSISTANT

## 2023-12-12 PROCEDURE — 99214 OFFICE O/P EST MOD 30 MIN: CPT | Performed by: PHYSICIAN ASSISTANT

## 2023-12-12 PROCEDURE — 3079F DIAST BP 80-89 MM HG: CPT | Performed by: PHYSICIAN ASSISTANT

## 2023-12-12 PROCEDURE — 1160F RVW MEDS BY RX/DR IN RCRD: CPT | Performed by: PHYSICIAN ASSISTANT

## 2023-12-12 PROCEDURE — 1126F AMNT PAIN NOTED NONE PRSNT: CPT | Performed by: PHYSICIAN ASSISTANT

## 2023-12-12 RX ORDER — SERTRALINE HYDROCHLORIDE 50 MG/1
TABLET, FILM COATED ORAL
Qty: 360 TABLET | Refills: 0 | Status: SHIPPED | OUTPATIENT
Start: 2023-12-12 | End: 2024-03-28 | Stop reason: SDUPTHER

## 2023-12-12 ASSESSMENT — COLUMBIA-SUICIDE SEVERITY RATING SCALE - C-SSRS
1. IN THE PAST MONTH, HAVE YOU WISHED YOU WERE DEAD OR WISHED YOU COULD GO TO SLEEP AND NOT WAKE UP?: NO
6. HAVE YOU EVER DONE ANYTHING, STARTED TO DO ANYTHING, OR PREPARED TO DO ANYTHING TO END YOUR LIFE?: NO
2. HAVE YOU ACTUALLY HAD ANY THOUGHTS OF KILLING YOURSELF?: NO

## 2023-12-12 ASSESSMENT — ENCOUNTER SYMPTOMS
DEPRESSION: 0
OCCASIONAL FEELINGS OF UNSTEADINESS: 0
LOSS OF SENSATION IN FEET: 0

## 2023-12-12 ASSESSMENT — PATIENT HEALTH QUESTIONNAIRE - PHQ9
1. LITTLE INTEREST OR PLEASURE IN DOING THINGS: NOT AT ALL
SUM OF ALL RESPONSES TO PHQ9 QUESTIONS 1 AND 2: 0
2. FEELING DOWN, DEPRESSED OR HOPELESS: NOT AT ALL

## 2023-12-12 ASSESSMENT — PAIN SCALES - GENERAL: PAINLEVEL: 0-NO PAIN

## 2023-12-12 NOTE — PATIENT INSTRUCTIONS
[facility]  [address] [address]  [address] [facility] [address]       NAME: Thompson Carranza   DATE: 12/12/23    Your Sleep Provider Today: Rhina Bennett PA-C  Your Primary Care Physician: No Assigned PCP Generic Provider, MD   Your Referring Provider: Alma Mendez MD*    DIAGNOSIS:   1. Suspected sleep apnea  In-Center Sleep Study (Sleep Provider Only)          Thank you for coming to the Sleep Medicine Clinic today! Your sleep medicine provider today was: Rhina Bennett PA-C Below is a summary of your treatment plan, other important information, and our contact numbers:      TREATMENT PLAN           Instructions - Common DANY Recs: - Avoid alcohol or sedatives several hours prior to sleeping.   - Avoid driving or operating heavy machinery when drowsy. A person driving while sleepy is five (5) times more likely to have an accident. If you feel sleepy, pull over and take a short power nap (sleep for less than 30 minutes). Otherwise, ask somebody to drive you.          Follow-up Appointment:   Please follow up 2-3 weeks after your sleep study. Once your sleep study is scheduled, please call my office to set up a follow up appointment so we can discuss results.   Appointments (for Adult Sleep Clinic): [phone] - option 2      IMPORTANT INFORMATION     Call 911 for medical emergencies.  Our offices are generally open from Monday-Friday, 9 am - 5 pm.  If you need to get in touch with me, you may either call me and my team(number is below) or you can use finalsite.  If a referral for a test, for CPAP, or for another specialist was made, and you have not heard about scheduling this within a week, please call scheduling at [phone]).  If you are unable to make your appointment for clinic or an overnight study, kindly call the office at least 48 hours in advance to cancel and reschedule.  If you are on CPAP, please bring your  device's card or the device to each clinic appointment.   There are no supporting services by either the sleep doctors or their staff on weekends and Holidays, or after 5 PM on weekdays.   If you have been asked to come to a sleep study, make sure you bring toiletries, a comfy pillow, and any nighttime medications that you may regularly take. Also be sure to eat dinner before you arrive. We generally do not provide meals.      PRESCRIPTIONS     We require 7 days advanced notice for prescription refills. If we do not receive the request in this time, we cannot guarantee that your medication will be refilled in time.      IMPORTANT PHONE NUMBERS     Sleep Medicine Clinic Fax: [phone]  Appointments (for Adult Sleep Clinic): [phone] - option 2  Appointments (For Sleep Studies): [phone] - option 3  Behavioral Sleep Medicine: [phone]  Sleep Surgery: [phone]  ENT (Otolaryngology): [phone]  Headache Clinic (Neurology): [phone]  Neurology: [phone]  Psychiatry: [phone]  Pulmonary Function Testing (PFT) Center: [phone]  Pulmonary Medicine: [phone]  [employer] (DME): [phone]  [employer] (DME): [phone]  [facility] ([facility]): [phone]      OUR ADULT SLEEP MEDICINE TEAM   Please do not hesitate to call the office or sleep nurse with any questions between appointments:    Adult Sleep Nurses (Valerie Cody RN and Marion Burgess RN):  For clinical questions and refilling prescriptions: [phone]  Email sleep diaries and other documents at: [email or web address]    Adult Sleep Medicine Secretaries:  Hayley Perez (For [place]/Yeshaina/[place]):   P: [phone]  F: [phone]  Ros Mcintosh (For [place]): P: [phone]  Fax: [phone]  Susan Cervantes (For [place]/Blank): P: [phone]  F: [phone]  Radha Suarez (For [place]): P: [phone]  F:  [phone]  Erin Madelyn (For [place]mai/[place]/Zakhary): P: [phone]  F: [phone]  Jeanette Kim (For [place]): P: [phone]  F: [phone]     Adult Sleep Medicine Advanced Practice Providers:  Julio Kern ([place], [place])  Flor Brizuela ([facility])  Zahraa Gallegos CNP ([place], [place], [place])  Eliane Bennett CNP ([place], [place], [place])  Ghada Odonnell ([place], [place], [place])  Carloz Thomas CNP ([place], [place])        OUR SLEEP TESTING LOCATIONS     Our team will contact you to schedule your sleep study, however, you can contact us as follow:  Main Phone Line (scheduling only): [phone]), option 3  Adult and Pediatric Locations  [facility] (6 years and older): [facility] - 4th floor ([facility]) After hours line: [phone]  [facility] (Main campus: All ages): [facility], 6th floor. After hours line: [phone]   [place] (5 years and older; younger considered on case-by-case basis): [address]; [address], [address]. Scheduling  After hours line: [phone]   [place] (6 years and older): [address]; [address]; [address]   [place] (6 years and older): [address] [facility] [place], [address]  After hours line: [phone]   [place] (13 years and older) in [place]: [address], 2nd floor  After hours line: [phone]  [facility] (13 year and older): [address]  After hours line: [phone]     Adult Only Locations:   [place] (18 years and older): [address] [facility], 2nd floor   [place] (18 years and older): [address] [facility]; 4th floor  After hours line: [phone]  [facility] (18 years and older) at [place]: [address] [place] [address]  After hours line: [phone]          CONTACTING YOUR SLEEP MEDICINE PROVIDER     Send a message directly to your provider through \"My Chart\", which is the " "email service through your  Records Account: [email or web address]   Call [phone] and leave a message. One of the administrative assistants will forward the message to your sleep medicine provider through \"My Chart\" and/or email.     Your sleep medicine provider for this visit was: Rhina Bennett PA-C    "

## 2023-12-12 NOTE — PROGRESS NOTES
Patient: Thompson Carranza    [id number]  : 1950 -- AGE 73 y.o.    Provider: Rhina Bennett PA-C     Location [facility] [employer] [facility]   Service Date: 2023              [facility]  New Visit Note      HISTORY OF PRESENT ILLNESS     The patient's referring provider is: Alma Mendez MD*; No Assigned PCP Generic Provider, MD    HISTORY OF PRESENT ILLNESS   Thompson Carranza is a 73 y.o. male with h/o T2DM,  atrial fibrillation, cardiomyopathy and multiple additional comorbidities who presents to a [facility] for a sleep medicine evaluation with concerns of possible sleep apnea.     Past Sleep History  Patient has not had a sleep study in the past.      Current History    On today's visit, the patient reports he feels that his sleep is unrefreshing. Endorses snoring, frequent nocturnal awakenings. Does have dry mouth and sore throat in AM. Reports noctuiria x3-4 per night.    Denies RLS symptoms. No sleep related hallucinations, dream enactment or other parasomnias.    Sleep schedule:  In bed: 10PM  Subjective sleep latency:  30-60 min   Awakenings during night:  3-4- nocturia  Final awakening time:  7AM  Naps: 5PM x1 hour 1-2 times weekly     Overall estimate of total sleep time: 6-7 hours  Weekends/Days off: same    Preferred sleeping position: SLEEP POSITION: sidelying or reclined        ESS:  9  COREY:  13        REVIEW OF SYSTEMS     REVIEW OF SYSTEMS  See HPI; all other ROS were reviewed and negative for compliant        ALLERGIES AND MEDICATIONS     ALLERGIES  Allergies   Allergen Reactions    Peanut Hives     denies    Fluoride Toothpaste Unknown     toothpaste    Ritonavir Other     Metallic taste       MEDICATIONS  Current Outpatient Medications   Medication Sig Dispense Refill    apixaban (Eliquis) 5 mg tablet TAKE 1 TABLET BY MOUTH TWO TIMES A  tablet 3    Basaglar KwikPen U-100 Insulin 100 unit/mL (3 mL) pen  "Inject under the skin. INJECT 16 UNITS UNDER THE SKIN IN THE MORNING AND INJECT 24 UNITS IN THE EVENING.      BD Ultra-Fine Short Pen Needle 31 gauge x 5/16\" needle       blood sugar diagnostic strip TEST BLOOD GLUCOSE TWO TIMES A  each 2    cholecalciferol (Vitamin D-3) 50 MCG (2000 UT) tablet Take 1 tablet (2,000 Units) by mouth 1 (one) time per week.      clopidogrel (Plavix) 75 mg tablet Take 1 tablet (75 mg) by mouth once daily. 90 tablet 3    docusate sodium (Colace) 100 mg capsule Take 1 capsule (100 mg) by mouth twice a day.      dolutegravir (Tivicay) 50 mg tablet TAKE 1 TABLET BY MOUTH DAILY 30 tablet 5    empagliflozin (Jardiance) 10 mg TAKE 1 TABLET BY MOUTH DAILY 90 tablet 3    emtricitabine-tenofovir alafen (Descovy) 200-25 mg tablet TAKE 1 TABLET BY MOUTH DAILY 30 tablet 5    Entresto  mg tablet Take 1 tablet by mouth 2 times a day. 180 tablet 3    eplerenone (Inspra) 25 mg tablet TAKE 1 TABLET BY MOUTH ONCE DAILY 90 tablet 3    escitalopram (Lexapro) 20 mg tablet Take 1 tablet (20 mg) by mouth once daily.      ezetimibe (Zetia) 10 mg tablet TAKE 1 TABLET BY MOUTH ONCE DAILY AT BEDTIME 90 tablet 3    flash glucose sensor ([facility]YLE [facility]ENS[facility]) every 14 (fourteen) days. Change sensor      FreeStyle Evan reader misc USE READER TO CHECK YOUR BLOOD GLUCOSE 4 X DAY 1 each 0    gabapentin (Neurontin) 100 mg capsule Take by mouth. Take 1 capsule every morning with breakfast and 3 capsules at bedtime.      insulin glargine (Lantus U-100 Insulin) 100 unit/mL injection Inject 25 Units under the skin once daily at bedtime.      insulin glargine (Lantus) 100 unit/mL (3 mL) pen INJECT 16 UNITS UNDER THE SKIN IN THE MORNING AND INJECT 24 UNITS IN THE EVENING. 45 mL 2    isosorbide dinitrate (Isordil) 10 mg tablet TAKE 1 TABLET BY MOUTH THREE TIMES A  tablet 3    lancets 33 gauge misc TEST BLOOD SUGAR TWICE A DAY AS DIRECTED 200 each 2    metoprolol succinate XL (Toprol-XL) 200 mg 24 hr " "tablet TAKE 1 TABLET BY MOUTH ONCE DAILY 90 tablet 3    OneTouch Delica Plus Lancet 33 gauge misc       OneTouch Ultra Test strip 1 strip 2 times a day. TEST BLOOD GLUCOSE      pen needle, diabetic 31 gauge x 5/16\" needle USE AS DIRECTED TWO TIMES A  each 3    riboflavin (Vitamin B-2) 400 mg tablet TAKE ONE TABLET BY MOUTH DAILY WITH FOOD 30 tablet 3    sertraline (Zoloft) 50 mg tablet Take by mouth. TAKE 2 tab in the am, 2 TABLETS BY MOUTH in the evening daily WITH FOOD.      amLODIPine (Norvasc) 10 mg tablet Take 1 tablet (10 mg) by mouth once daily.      amLODIPine (Norvasc) 5 mg tablet Take 1 tablet (5 mg) by mouth once daily.      aspirin 81 mg EC tablet Take 1 tablet (81 mg) by mouth once daily.      atorvastatin (Lipitor) 40 mg tablet Take 1 tablet (40 mg) by mouth once daily.      cholecalciferol (Vitamin D-3) 25 MCG (1000 UT) capsule Take 1 capsule (25 mcg) by mouth once daily.      dulaglutide (Trulicity) 1.5 mg/0.5 mL pen injector injection Inject 1.5 mg under the skin 1 (one) time per week.      Entresto 49-51 mg tablet       ezetimibe (Zetia) 10 mg tablet Take 1 tablet (10 mg) by mouth once daily at bedtime.      FreeStyle Darryl sensor system kit CHANGE SENSOR EVERY 14 DAYS AS DIRECTED (Patient not taking: Reported on 11/21/2023) 2 each 4    glipiZIDE (Glucotrol) 10 mg tablet Take 1 tablet (10 mg) by mouth 2 times a day before meals.      glipiZIDE (Glucotrol) 5 mg tablet Take 1 tablet (5 mg) by mouth once daily in the morning.      hydrALAZINE (Apresoline) 10 mg tablet TAKE 1 TABLET BY MOUTH THREE TIMES A DAY (Patient not taking: Reported on 12/5/2023) 270 tablet 3    metFORMIN  mg 24 hr tablet Take 1 tablet (500 mg) by mouth 2 times a day with meals. with breakfast and dinner.      mirabegron (Myrbetriq) 50 mg tablet extended release 24 hr 24 hr tablet Take 1 tablet (50 mg) by mouth once daily. 30 tablet 2    omega-3 acid ethyl esters (Lovaza) 1 gram capsule TAKE 1 CAPSULE BY MOUTH ONCE " DAILY. (Patient not taking: Reported on 11/21/2023) 30 capsule 1    trospium (Sanctura XR) 60 mg 24 hour capsule Take 1 capsule (60 mg) by mouth once daily.       No current facility-administered medications for this visit.         PAST HISTORY     PAST MEDICAL HISTORY  He  has a past medical history of Disease of salivary gland, unspecified, Lipomatosis, not elsewhere classified, Nasal congestion (09/11/2015), Other conditions influencing health status (11/26/2018), Other conditions influencing health status (10/31/2013), Other disturbances of smell and taste (05/14/2019), Other muscle spasm, Other specified dermatitis, Other symptoms and signs involving general sensations and perceptions (12/21/2015), Personal history of diseases of the skin and subcutaneous tissue, Personal history of diseases of the skin and subcutaneous tissue, Personal history of diseases of the skin and subcutaneous tissue, Personal history of other diseases of male genital organs (04/20/2017), Personal history of other diseases of the nervous system and sense organs, Personal history of other diseases of the respiratory system, Personal history of other diseases of the respiratory system (02/16/2016), Personal history of other diseases of the respiratory system (05/08/2019), Personal history of other diseases of the respiratory system (02/03/2020), Personal history of other diseases of the respiratory system (05/08/2015), Personal history of other diseases of the respiratory system (07/29/2017), Personal history of other diseases of urinary system, Personal history of other diseases of urinary system, Personal history of other endocrine, nutritional and metabolic disease, Personal history of other infectious and parasitic diseases, Personal history of other infectious and parasitic diseases, Personal history of other infectious and parasitic diseases, Personal history of other specified conditions, Personal history of other specified  "conditions (11/06/2017), Personal history of other specified conditions (03/24/2017), Personal history of other specified conditions (12/07/2021), Personal history of pneumonia (recurrent), Personal history of pneumonia (recurrent), Personal history of urinary (tract) infections, Spondylosis without myelopathy or radiculopathy, 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    PAST SURGICAL HISTORY:  Past Surgical History:   Procedure Laterality Date    COLONOSCOPY  04/10/2014    Complete Colonoscopy    OTHER SURGICAL HISTORY  04/10/2014    Biopsy Skin    OTHER SURGICAL HISTORY  04/10/2014    Biopsy Of The Prostate Incisional    OTHER SURGICAL HISTORY  04/10/2014    Biopsy Bone Marrow    PROSTATECTOMY  11/14/2022    Prostatectomy Radical       FAMILY HISTORY  Family History   Problem Relation Name Age of Onset    Alcohol abuse Mother      Depression Mother      Diabetes Mother      Alcohol abuse Father      Stroke Father      Anxiety disorder Sister      Coronary artery disease Sister          multiple vessel    Diabetes Sister      Dementia Sister          senile    Dementia Brother      Dementia Paternal Cousin           SOCIAL HISTORY  He  reports that he quit smoking about 15 years ago. His smoking use included cigarettes. He has never used smokeless tobacco. He reports that he does not currently use alcohol. He reports that he does not use drugs. He    Caffeine consumption: Yes, rarely (occasional)  Alcohol consumption: Yes, rarely (occasional)  Marijuana: No      PHYSICAL EXAM     VITAL SIGNS: /87 (BP Location: Right arm, Patient Position: Sitting, BP Cuff Size: Adult long)   Pulse 60   Temp 36.8 °C (98.2 °F) (Temporal)   Ht 1.727 m (5' 8\")   Wt 98.9 kg (218 lb)   BMI 33.15 kg/m²      CURRENT WEIGHT:   Vitals: "    12/12/23 1609   Weight: 98.9 kg (218 lb)     Body mass index is 33.15 kg/m².  PREVIOUS WEIGHTS:  Wt Readings from Last 3 Encounters:   12/12/23 98.9 kg (218 lb)   12/05/23 98.9 kg (218 lb)   11/21/23 96.2 kg (212 lb)       Constitutional: Alert and oriented, cooperative, no acute distress  Head: Normocephalic, atraumatic   Cranial Features: No abnormal craniofacial features     Upper Airway Examination:  Mallampati Class: 3  Tongue Scalloping: none  Uvula: midline    Neck: Supple. Trachea midline.  Pulmonary: Non-labored breathing, speaks in full sentences.   Cardiac: regular rate   Extremities: No clubbing, no edema  Neuromuscular: Cranial nerves grossly intact, no focal deficits      RESULTS/DATA     Bicarbonate (mmol/L)   Date Value   11/09/2023 25   09/13/2023 26   07/24/2023 25   05/17/2023 25     Iron (ug/dL)   Date Value   07/24/2023 73   05/09/2023 43   12/29/2022 55     Iron Saturation (%)   Date Value   07/24/2023 20 (L)   05/09/2023 14 (L)   12/29/2022 14 (L)     TIBC (ug/dL)   Date Value   07/24/2023 360   05/09/2023 304   12/29/2022 387     Ferritin (ug/L)   Date Value   07/24/2023 630 (H)   05/09/2023 695 (H)   12/29/2022 41             ASSESSMENT/PLAN     Mr. Carranza is a 73 y.o. male and He was referred to the [facility] for evaluation of possible sleep disordered breathing    Problem List, Orders, Assessment, Recommendations:  Problem List Items Addressed This Visit             ICD-10-CM    Suspected sleep apnea - Primary R29.818     SLEEP APNEA - SUSPECTED  -Sleep study ordered- [place]   -Diet, exercise, and weight loss were emphasized today in clinic, as were non-supine sleep, avoiding alcohol in the late evening, and driving or operating heavy machinery when sleepy.           Relevant Orders    In-Center Sleep Study (Sleep Provider Only)       Disposition    RTC 2-3 weeks after sleep study to go over results

## 2023-12-13 ENCOUNTER — PHARMACY VISIT (OUTPATIENT)
Dept: PHARMACY | Facility: CLINIC | Age: 73
End: 2023-12-13
Payer: MEDICARE

## 2023-12-13 PROBLEM — R29.818 SUSPECTED SLEEP APNEA: Status: ACTIVE | Noted: 2023-12-13

## 2023-12-13 PROCEDURE — RXMED WILLOW AMBULATORY MEDICATION CHARGE

## 2023-12-13 ASSESSMENT — SLEEP AND FATIGUE QUESTIONNAIRES
HOW LIKELY ARE YOU TO NOD OFF OR FALL ASLEEP WHEN YOU ARE A PASSENGER IN A CAR FOR AN HOUR WITHOUT A BREAK: SLIGHT CHANCE OF DOZING
HOW LIKELY ARE YOU TO NOD OFF OR FALL ASLEEP WHILE LYING DOWN TO REST IN THE AFTERNOON WHEN CIRCUMSTANCES PERMIT: SLIGHT CHANCE OF DOZING
SITING INACTIVE IN A PUBLIC PLACE LIKE A CLASS ROOM OR A MOVIE THEATER: SLIGHT CHANCE OF DOZING
HOW LIKELY ARE YOU TO NOD OFF OR FALL ASLEEP WHILE SITTING QUIETLY AFTER LUNCH WITHOUT ALCOHOL: SLIGHT CHANCE OF DOZING
ESS-CHAD TOTAL SCORE: 9
HOW LIKELY ARE YOU TO NOD OFF OR FALL ASLEEP WHILE WATCHING TV: MODERATE CHANCE OF DOZING
HOW LIKELY ARE YOU TO NOD OFF OR FALL ASLEEP WHILE SITTING AND READING: HIGH CHANCE OF DOZING
HOW LIKELY ARE YOU TO NOD OFF OR FALL ASLEEP WHILE SITTING AND TALKING TO SOMEONE: WOULD NEVER DOZE
HOW LIKELY ARE YOU TO NOD OFF OR FALL ASLEEP IN A CAR, WHILE STOPPED FOR A FEW MINUTES IN TRAFFIC: WOULD NEVER DOZE

## 2023-12-13 NOTE — ASSESSMENT & PLAN NOTE
SLEEP APNEA - SUSPECTED  -Sleep study [facility]   -Diet, exercise, and weight loss were emphasized today in clinic, as were non-supine sleep, avoiding alcohol in the late evening, and driving or operating heavy machinery when sleepy.

## 2023-12-14 ENCOUNTER — HOSPITAL ENCOUNTER (OUTPATIENT)
Dept: CARDIOLOGY | Facility: HOSPITAL | Age: 73
Discharge: HOME | End: 2023-12-14
Payer: MEDICARE

## 2023-12-14 ENCOUNTER — HOSPITAL ENCOUNTER (OUTPATIENT)
Dept: RADIOLOGY | Facility: HOSPITAL | Age: 73
Discharge: HOME | End: 2023-12-14
Payer: MEDICARE

## 2023-12-14 VITALS
RESPIRATION RATE: 17 BRPM | SYSTOLIC BLOOD PRESSURE: 121 MMHG | DIASTOLIC BLOOD PRESSURE: 77 MMHG | OXYGEN SATURATION: 95 % | HEART RATE: 60 BPM

## 2023-12-14 DIAGNOSIS — Z95.0 PACEMAKER: ICD-10-CM

## 2023-12-14 DIAGNOSIS — R51.9 HEADACHE, UNSPECIFIED: ICD-10-CM

## 2023-12-14 DIAGNOSIS — R41.3 OTHER AMNESIA: ICD-10-CM

## 2023-12-14 PROCEDURE — 93287 PERI-PX DEVICE EVAL & PRGR: CPT

## 2023-12-14 PROCEDURE — 70553 MRI BRAIN STEM W/O & W/DYE: CPT | Performed by: RADIOLOGY

## 2023-12-14 PROCEDURE — 93287 PERI-PX DEVICE EVAL & PRGR: CPT | Performed by: INTERNAL MEDICINE

## 2023-12-14 PROCEDURE — 2550000001 HC RX 255 CONTRASTS: Performed by: PSYCHIATRY & NEUROLOGY

## 2023-12-14 PROCEDURE — A9575 INJ GADOTERATE MEGLUMI 0.1ML: HCPCS | Performed by: PSYCHIATRY & NEUROLOGY

## 2023-12-14 PROCEDURE — 70553 MRI BRAIN STEM W/O & W/DYE: CPT

## 2023-12-14 RX ORDER — GADOTERATE MEGLUMINE 376.9 MG/ML
20 INJECTION INTRAVENOUS
Status: COMPLETED | OUTPATIENT
Start: 2023-12-14 | End: 2023-12-14

## 2023-12-14 RX ADMIN — GADOTERATE MEGLUMINE 20 ML: 376.9 INJECTION INTRAVENOUS at 13:15

## 2023-12-14 NOTE — NURSING NOTE
Patient is alert and able to make needs Known; has non conditional pacemaker and has been consented by the radiology doctor. The device clinical nurse is presently checking patient's pacemaker and will set pacemaker into MRI safe mode. The device clinical nurse will also be monitor patient during MRI. ROHINI

## 2023-12-20 ENCOUNTER — TELEPHONE (OUTPATIENT)
Dept: NEUROLOGY | Facility: HOSPITAL | Age: 73
End: 2023-12-20
Payer: MEDICARE

## 2023-12-20 NOTE — TELEPHONE ENCOUNTER
I left a telephone message for him just now.    Recently completed brain MRI shows a moderate, relatively symmetric burden of nonspecific appearing subcortical and periventricular white matter change on FLAIR.    No striking temporal atrophy.  No stigmata of hydrocephalus.    No abnormal enhancement.  No concerning findings with regard to his headache complaint.    I await a call back.

## 2023-12-22 ENCOUNTER — PHARMACY VISIT (OUTPATIENT)
Dept: PHARMACY | Facility: CLINIC | Age: 73
End: 2023-12-22
Payer: MEDICARE

## 2023-12-28 PROCEDURE — RXMED WILLOW AMBULATORY MEDICATION CHARGE

## 2024-01-04 ENCOUNTER — PHARMACY VISIT (OUTPATIENT)
Dept: PHARMACY | Facility: CLINIC | Age: 74
End: 2024-01-04
Payer: MEDICARE

## 2024-01-04 PROCEDURE — RXOTC WILLOW AMBULATORY OTC CHARGE

## 2024-01-16 ENCOUNTER — PHARMACY VISIT (OUTPATIENT)
Dept: PHARMACY | Facility: CLINIC | Age: 74
End: 2024-01-16
Payer: MEDICARE

## 2024-01-16 PROCEDURE — RXMED WILLOW AMBULATORY MEDICATION CHARGE

## 2024-02-05 PROCEDURE — RXMED WILLOW AMBULATORY MEDICATION CHARGE

## 2024-02-08 PROCEDURE — RXMED WILLOW AMBULATORY MEDICATION CHARGE

## 2024-02-09 ENCOUNTER — PHARMACY VISIT (OUTPATIENT)
Dept: PHARMACY | Facility: CLINIC | Age: 74
End: 2024-02-09
Payer: MEDICARE

## 2024-02-16 DIAGNOSIS — E11.3293 TYPE 2 DIABETES MELLITUS WITH MILD NONPROLIFERATIVE RETINOPATHY OF BOTH EYES WITHOUT MACULAR EDEMA, UNSPECIFIED WHETHER LONG TERM INSULIN USE (MULTI): Primary | ICD-10-CM

## 2024-02-16 PROCEDURE — RXMED WILLOW AMBULATORY MEDICATION CHARGE

## 2024-02-16 RX ORDER — BLOOD SUGAR DIAGNOSTIC
STRIP MISCELLANEOUS
Qty: 200 EACH | Refills: 2 | Status: CANCELLED | OUTPATIENT
Start: 2024-02-16 | End: 2025-02-14

## 2024-02-16 RX ORDER — BLOOD SUGAR DIAGNOSTIC
STRIP MISCELLANEOUS
Qty: 200 EACH | Refills: 2 | Status: SHIPPED | OUTPATIENT
Start: 2024-02-16 | End: 2025-02-14

## 2024-02-19 ENCOUNTER — PHARMACY VISIT (OUTPATIENT)
Dept: PHARMACY | Facility: CLINIC | Age: 74
End: 2024-02-19
Payer: MEDICARE

## 2024-02-19 PROCEDURE — RXMED WILLOW AMBULATORY MEDICATION CHARGE

## 2024-02-26 ENCOUNTER — PHARMACY VISIT (OUTPATIENT)
Dept: PHARMACY | Facility: CLINIC | Age: 74
End: 2024-02-26
Payer: MEDICARE

## 2024-02-26 PROCEDURE — RXMED WILLOW AMBULATORY MEDICATION CHARGE

## 2024-03-05 ENCOUNTER — APPOINTMENT (OUTPATIENT)
Dept: ENDOCRINOLOGY | Facility: CLINIC | Age: 74
End: 2024-03-05
Payer: MEDICARE

## 2024-03-11 PROCEDURE — RXMED WILLOW AMBULATORY MEDICATION CHARGE

## 2024-03-12 ENCOUNTER — PHARMACY VISIT (OUTPATIENT)
Dept: PHARMACY | Facility: CLINIC | Age: 74
End: 2024-03-12
Payer: MEDICARE

## 2024-03-13 ENCOUNTER — OFFICE VISIT (OUTPATIENT)
Dept: IMMUNOLOGY | Facility: CLINIC | Age: 74
End: 2024-03-13
Payer: MEDICARE

## 2024-03-13 VITALS
BODY MASS INDEX: 33.84 KG/M2 | OXYGEN SATURATION: 92 % | DIASTOLIC BLOOD PRESSURE: 90 MMHG | RESPIRATION RATE: 16 BRPM | HEIGHT: 67 IN | TEMPERATURE: 98 F | HEART RATE: 62 BPM | WEIGHT: 215.6 LBS | SYSTOLIC BLOOD PRESSURE: 142 MMHG

## 2024-03-13 DIAGNOSIS — Z23 NEED FOR VACCINATION: ICD-10-CM

## 2024-03-13 DIAGNOSIS — B20 HIV INFECTION, UNSPECIFIED SYMPTOM STATUS (MULTI): ICD-10-CM

## 2024-03-13 LAB
ALBUMIN SERPL BCP-MCNC: 4.3 G/DL (ref 3.4–5)
ALP SERPL-CCNC: 68 U/L (ref 33–136)
ALT SERPL W P-5'-P-CCNC: 33 U/L (ref 10–52)
ANION GAP SERPL CALC-SCNC: 15 MMOL/L (ref 10–20)
AST SERPL W P-5'-P-CCNC: 24 U/L (ref 9–39)
BASOPHILS # BLD AUTO: 0.02 X10*3/UL (ref 0–0.1)
BASOPHILS NFR BLD AUTO: 0.4 %
BILIRUB SERPL-MCNC: 0.4 MG/DL (ref 0–1.2)
BUN SERPL-MCNC: 20 MG/DL (ref 6–23)
CALCIUM SERPL-MCNC: 9.7 MG/DL (ref 8.6–10.6)
CHLORIDE SERPL-SCNC: 105 MMOL/L (ref 98–107)
CO2 SERPL-SCNC: 24 MMOL/L (ref 21–32)
CREAT SERPL-MCNC: 1.37 MG/DL (ref 0.5–1.3)
EGFRCR SERPLBLD CKD-EPI 2021: 54 ML/MIN/1.73M*2
EOSINOPHIL # BLD AUTO: 0.12 X10*3/UL (ref 0–0.4)
EOSINOPHIL NFR BLD AUTO: 2.5 %
ERYTHROCYTE [DISTWIDTH] IN BLOOD BY AUTOMATED COUNT: 14.7 % (ref 11.5–14.5)
GLUCOSE SERPL-MCNC: 63 MG/DL (ref 74–99)
HCT VFR BLD AUTO: 53.5 % (ref 41–52)
HGB BLD-MCNC: 16.4 G/DL (ref 13.5–17.5)
IMM GRANULOCYTES # BLD AUTO: 0.01 X10*3/UL (ref 0–0.5)
IMM GRANULOCYTES NFR BLD AUTO: 0.2 % (ref 0–0.9)
LYMPHOCYTES # BLD AUTO: 3.4 X10*3/UL (ref 0.8–3)
LYMPHOCYTES NFR BLD AUTO: 69.8 %
MCH RBC QN AUTO: 25.9 PG (ref 26–34)
MCHC RBC AUTO-ENTMCNC: 30.7 G/DL (ref 32–36)
MCV RBC AUTO: 84 FL (ref 80–100)
MONOCYTES # BLD AUTO: 0.51 X10*3/UL (ref 0.05–0.8)
MONOCYTES NFR BLD AUTO: 10.5 %
NEUTROPHILS # BLD AUTO: 0.81 X10*3/UL (ref 1.6–5.5)
NEUTROPHILS NFR BLD AUTO: 16.6 %
NRBC BLD-RTO: 0 /100 WBCS (ref 0–0)
PLATELET # BLD AUTO: 226 X10*3/UL (ref 150–450)
POTASSIUM SERPL-SCNC: 4.4 MMOL/L (ref 3.5–5.3)
PROT SERPL-MCNC: 7.3 G/DL (ref 6.4–8.2)
RBC # BLD AUTO: 6.34 X10*6/UL (ref 4.5–5.9)
SODIUM SERPL-SCNC: 140 MMOL/L (ref 136–145)
WBC # BLD AUTO: 4.9 X10*3/UL (ref 4.4–11.3)

## 2024-03-13 PROCEDURE — 36415 COLL VENOUS BLD VENIPUNCTURE: CPT | Performed by: INTERNAL MEDICINE

## 2024-03-13 PROCEDURE — 99214 OFFICE O/P EST MOD 30 MIN: CPT | Performed by: INTERNAL MEDICINE

## 2024-03-13 PROCEDURE — 3077F SYST BP >= 140 MM HG: CPT | Performed by: INTERNAL MEDICINE

## 2024-03-13 PROCEDURE — 1159F MED LIST DOCD IN RCRD: CPT | Performed by: INTERNAL MEDICINE

## 2024-03-13 PROCEDURE — 90750 HZV VACC RECOMBINANT IM: CPT | Performed by: INTERNAL MEDICINE

## 2024-03-13 PROCEDURE — 87536 HIV-1 QUANT&REVRSE TRNSCRPJ: CPT | Performed by: INTERNAL MEDICINE

## 2024-03-13 PROCEDURE — 3008F BODY MASS INDEX DOCD: CPT | Performed by: INTERNAL MEDICINE

## 2024-03-13 PROCEDURE — 88185 FLOWCYTOMETRY/TC ADD-ON: CPT | Mod: TC | Performed by: INTERNAL MEDICINE

## 2024-03-13 PROCEDURE — 1126F AMNT PAIN NOTED NONE PRSNT: CPT | Performed by: INTERNAL MEDICINE

## 2024-03-13 PROCEDURE — 85025 COMPLETE CBC W/AUTO DIFF WBC: CPT | Performed by: INTERNAL MEDICINE

## 2024-03-13 PROCEDURE — G0009 ADMIN PNEUMOCOCCAL VACCINE: HCPCS | Mod: 59 | Performed by: INTERNAL MEDICINE

## 2024-03-13 PROCEDURE — 3080F DIAST BP >= 90 MM HG: CPT | Performed by: INTERNAL MEDICINE

## 2024-03-13 PROCEDURE — 84075 ASSAY ALKALINE PHOSPHATASE: CPT | Performed by: INTERNAL MEDICINE

## 2024-03-13 PROCEDURE — 1036F TOBACCO NON-USER: CPT | Performed by: INTERNAL MEDICINE

## 2024-03-13 ASSESSMENT — ENCOUNTER SYMPTOMS
RESPIRATORY NEGATIVE: 1
CONSTITUTIONAL NEGATIVE: 1
HEMATOLOGIC/LYMPHATIC NEGATIVE: 1
PSYCHIATRIC NEGATIVE: 1
MUSCULOSKELETAL NEGATIVE: 1
ALLERGIC/IMMUNOLOGIC NEGATIVE: 1
CARDIOVASCULAR NEGATIVE: 1
EYES NEGATIVE: 1
GASTROINTESTINAL NEGATIVE: 1
NEUROLOGICAL NEGATIVE: 1

## 2024-03-13 ASSESSMENT — PAIN SCALES - GENERAL: PAINLEVEL: 0-NO PAIN

## 2024-03-13 NOTE — PROGRESS NOTES
"HIV Clinic Follow-up Visit:    Thompson Carranza was last seen in [facility] on Visit date not found    Doing well. No major complaints. Stable on ART and compliant. Last labs in 9/2023 with CD4 1023 and UD VL. Follows up with cardiology and nephrology for issues    Review of Systems  Review of Systems   Constitutional: Negative.    HENT: Negative.     Eyes: Negative.    Respiratory: Negative.     Cardiovascular: Negative.    Gastrointestinal: Negative.    Genitourinary: Negative.    Musculoskeletal: Negative.    Skin: Negative.    Allergic/Immunologic: Negative.    Neurological: Negative.    Hematological: Negative.    Psychiatric/Behavioral: Negative.     All other systems reviewed and are negative.      CURRENT MEDICATIONS:    Current Outpatient Medications:     apixaban (Eliquis) 5 mg tablet, TAKE 1 TABLET BY MOUTH TWO TIMES A DAY, Disp: 180 tablet, Rfl: 3    Basaglar KwikPen U-100 Insulin 100 unit/mL (3 mL) pen, Inject under the skin. INJECT 16 UNITS UNDER THE SKIN IN THE MORNING AND INJECT 24 UNITS IN THE EVENING., Disp: , Rfl:     BD Ultra-Fine Short Pen Needle 31 gauge x 5/16\" needle, , Disp: , Rfl:     blood sugar diagnostic (OneTouch Ultra Test) strip, TEST BLOOD GLUCOSE TWO TIMES A DAY, Disp: 200 each, Rfl: 2    cholecalciferol (Vitamin D-3) 25 MCG (1000 UT) capsule, Take 1 capsule (25 mcg) by mouth once daily., Disp: , Rfl:     clopidogrel (Plavix) 75 mg tablet, Take 1 tablet (75 mg) by mouth once daily., Disp: 90 tablet, Rfl: 3    docusate sodium (Colace) 100 mg capsule, Take 1 capsule (100 mg) by mouth twice a day., Disp: , Rfl:     dulaglutide (Trulicity) 1.5 mg/0.5 mL pen injector injection, Inject 1.5 mg under the skin 1 (one) time per week., Disp: , Rfl:     emtricitabine-tenofovir alafen (Descovy) 200-25 mg tablet, TAKE 1 TABLET BY MOUTH DAILY, Disp: 30 tablet, Rfl: 5    eplerenone (Inspra) 25 mg tablet, TAKE 1 TABLET BY MOUTH ONCE DAILY, Disp: 90 tablet, Rfl: 3    escitalopram (Lexapro) 20 mg tablet, Take " "1 tablet (20 mg) by mouth once daily., Disp: , Rfl:     ezetimibe (Zetia) 10 mg tablet, TAKE 1 TABLET BY MOUTH ONCE DAILY AT BEDTIME, Disp: 90 tablet, Rfl: 3    hydrALAZINE (Apresoline) 10 mg tablet, TAKE 1 TABLET BY MOUTH THREE TIMES A DAY, Disp: 270 tablet, Rfl: 3    isosorbide dinitrate (Isordil) 10 mg tablet, TAKE 1 TABLET BY MOUTH THREE TIMES A DAY, Disp: 270 tablet, Rfl: 3    metoprolol succinate XL (Toprol-XL) 200 mg 24 hr tablet, TAKE 1 TABLET BY MOUTH ONCE DAILY, Disp: 90 tablet, Rfl: 3    OneTouch Delica Plus Lancet 33 gauge misc, , Disp: , Rfl:     OneTouch Ultra Test strip, 1 strip 2 times a day. TEST BLOOD GLUCOSE, Disp: , Rfl:     pen needle, diabetic 31 gauge x 5/16\" needle, USE AS DIRECTED TWO TIMES A DAY, Disp: 200 each, Rfl: 3    riboflavin (Vitamin B-2) 400 mg tablet, TAKE ONE TABLET BY MOUTH DAILY WITH FOOD, Disp: 30 tablet, Rfl: 3    sertraline (Zoloft) 50 mg tablet, Take 1 tablet every morning and three tablets all by mouth every evening and all always with food., Disp: 360 tablet, Rfl: 0    amLODIPine (Norvasc) 10 mg tablet, Take 1 tablet (10 mg) by mouth once daily., Disp: , Rfl:     amLODIPine (Norvasc) 5 mg tablet, Take 1 tablet (5 mg) by mouth once daily., Disp: , Rfl:     aspirin 81 mg EC tablet, Take 1 tablet (81 mg) by mouth once daily., Disp: , Rfl:     atorvastatin (Lipitor) 40 mg tablet, Take 1 tablet (40 mg) by mouth once daily., Disp: , Rfl:     empagliflozin (Jardiance) 10 mg, TAKE 1 TABLET BY MOUTH DAILY (Patient not taking: Reported on 3/13/2024), Disp: 90 tablet, Rfl: 3    Entresto 49-51 mg tablet, , Disp: , Rfl:     Entresto  mg tablet, Take 1 tablet by mouth 2 times a day., Disp: 180 tablet, Rfl: 3    flash glucose sensor (FREESTYLE EVAN 2 SENSOR [facility] [place] every 14 (fourteen) days. Change sensor, Disp: , Rfl:     FreeStyle Evan reader misc, USE READER TO CHECK YOUR BLOOD GLUCOSE 4 X DAY (Patient not taking: Reported on 3/13/2024), Disp: 1 each, Rfl: 0    FreeStyle Evan " "sensor system kit, CHANGE SENSOR EVERY 14 DAYS AS DIRECTED (Patient not taking: Reported on 11/21/2023), Disp: 2 each, Rfl: 4    gabapentin (Neurontin) 100 mg capsule, Take by mouth. Take 1 capsule every morning with breakfast and 3 capsules at bedtime., Disp: , Rfl:     glipiZIDE (Glucotrol) 10 mg tablet, Take 1 tablet (10 mg) by mouth 2 times a day before meals., Disp: , Rfl:     glipiZIDE (Glucotrol) 5 mg tablet, Take 1 tablet (5 mg) by mouth once daily in the morning., Disp: , Rfl:     insulin glargine (Lantus U-100 Insulin) 100 unit/mL injection, Inject 25 Units under the skin once daily at bedtime., Disp: , Rfl:     insulin glargine (Lantus) 100 unit/mL (3 mL) pen, INJECT 16 UNITS UNDER THE SKIN IN THE MORNING AND INJECT 24 UNITS IN THE EVENING. (Patient not taking: Reported on 3/13/2024), Disp: 45 mL, Rfl: 2    metFORMIN  mg 24 hr tablet, Take 1 tablet (500 mg) by mouth 2 times a day with meals. with breakfast and dinner., Disp: , Rfl:     mirabegron (Myrbetriq) 50 mg tablet extended release 24 hr 24 hr tablet, Take 1 tablet (50 mg) by mouth once daily., Disp: 30 tablet, Rfl: 2    trospium (Sanctura XR) 60 mg 24 hour capsule, Take 1 capsule (60 mg) by mouth once daily., Disp: , Rfl:     PHYSICAL EXAMINATION:  Visit Vitals  /90 (BP Location: Right arm, Patient Position: Sitting, BP Cuff Size: Adult)   Pulse 62   Temp 36.7 °C (98 °F) (Skin)   Resp 16   Ht 1.702 m (5' 7\")   Wt 97.8 kg (215 lb 9.6 oz)   SpO2 92%   BMI 33.77 kg/m²   Smoking Status Former   BSA 2.15 m²       Physical Exam   Physical Exam  Vitals reviewed.   Constitutional:       Appearance: Normal appearance.   HENT:      Head: Normocephalic.      Nose: Nose normal.   Eyes:      Extraocular Movements: Extraocular movements intact.      Pupils: Pupils are equal, round, and reactive to light.   Cardiovascular:      Rate and Rhythm: Normal rate and regular rhythm.   Pulmonary:      Effort: Pulmonary effort is normal.   Abdominal:      " General: Abdomen is flat. Bowel sounds are normal.   Musculoskeletal:         General: Normal range of motion.      Cervical back: Normal range of motion.   Skin:     General: Skin is warm.   Neurological:      General: No focal deficit present.      Mental Status: He is alert.   Psychiatric:         Mood and Affect: Mood normal.         Behavior: Behavior normal.         Thought Content: Thought content normal.         Judgment: Judgment normal.         PERTINENT DATA:  CBC:  WBC   Date Value Ref Range Status   09/13/2023 3.4 (L) 4.4 - 11.3 x10E9/L Final     nRBC   Date Value Ref Range Status   09/13/2023 0.0 0.0 - 0.0 /100 WBC Final     RBC   Date Value Ref Range Status   09/13/2023 6.12 (H) 4.50 - 5.90 x10E12/L Final     Hemoglobin   Date Value Ref Range Status   09/13/2023 17.1 13.5 - 17.5 g/dL Final     MCV   Date Value Ref Range Status   09/13/2023 86 80 - 100 fL Final     RDW   Date Value Ref Range Status   09/13/2023 15.0 (H) 11.5 - 14.5 % Final       Renal Function Panel:  Glucose   Date Value Ref Range Status   11/09/2023 88 74 - 99 mg/dL Final     Sodium   Date Value Ref Range Status   11/09/2023 141 136 - 145 mmol/L Final     Potassium   Date Value Ref Range Status   11/09/2023 4.4 3.5 - 5.3 mmol/L Final     Chloride   Date Value Ref Range Status   11/09/2023 107 98 - 107 mmol/L Final     Anion Gap   Date Value Ref Range Status   11/09/2023 13 10 - 20 mmol/L Final     Urea Nitrogen   Date Value Ref Range Status   11/09/2023 27 (H) 6 - 23 mg/dL Final     Creatinine   Date Value Ref Range Status   11/09/2023 1.52 (H) 0.50 - 1.30 mg/dL Final     eGFR   Date Value Ref Range Status   11/09/2023 48 (L) >60 mL/min/1.73m*2 Final     Comment:     Calculations of estimated GFR are performed using the 2021 CKD-EPI Study Refit equation without the race variable for the IDMS-Traceable creatinine methods.  https://jasn.asnjournals.org/content/early/2021/09/22/ASN.8651477383     Calcium   Date Value Ref Range Status  "  11/09/2023 10.1 8.6 - 10.6 mg/dL Final     Phosphorus   Date Value Ref Range Status   07/24/2023 3.1 2.5 - 4.9 mg/dL Final     Comment:      The performance characteristics of phosphorus testing in   heparinized plasma have been validated by the individual     laboratory site where testing is performed. Testing    on heparinized plasma is not approved by the FDA;    however, such approval is not necessary.       Albumin   Date Value Ref Range Status   09/13/2023 4.4 3.4 - 5.0 g/dL Final       Hepatic Panel:  Albumin   Date Value Ref Range Status   09/13/2023 4.4 3.4 - 5.0 g/dL Final     Total Bilirubin   Date Value Ref Range Status   09/13/2023 0.4 0.0 - 1.2 mg/dL Final     Bilirubin, Direct   Date Value Ref Range Status   05/12/2023 CANCELED       Comment:     Result canceled by the ancillary.     Alkaline Phosphatase   Date Value Ref Range Status   09/13/2023 69 33 - 136 U/L Final     ALT (SGPT)   Date Value Ref Range Status   09/13/2023 59 (H) 10 - 52 U/L Final     Comment:      Patients treated with Sulfasalazine may generate    falsely decreased results for ALT.         HIV Viral Load:  No results found for: \"EDR0NAELCZ\", \"HIVRNAPCR\"    CD4 Count:  CD3+CD4+%   Date Value Ref Range Status   09/13/2023 49 29 - 57 % Final     CD3+CD4+ Absolute   Date Value Ref Range Status   09/13/2023 1.024 0.350 - 2.740 x10E9/L Final     CD3+CD8+%   Date Value Ref Range Status   09/13/2023 36 (H) 7 - 31 % Final     CD3+CD8+ Absolute   Date Value Ref Range Status   09/13/2023 0.752 0.080 - 1.490 x10E9/L Final     CD4/CD8 Ratio   Date Value Ref Range Status   09/13/2023 1.36 1.00 - 3.50 Final     CD45%   Date Value Ref Range Status   09/13/2023 100 % Final       CRCL:  Creatinine, Urine Random   Date Value Ref Range Status   11/09/2023 68.2 20.0 - 370.0 mg/dL Final       Lipid Panel:  HDL   Date Value Ref Range Status   07/24/2023 43.8 mg/dL Final     Comment:     .      AGE      VERY LOW   LOW     NORMAL    HIGH       0-19 Y  "      < 35   < 40     40-45     ----    20-24 Y       ----   < 40       >45     ----      >24 Y       ----   < 40     40-60      >60  .       Cholesterol/HDL Ratio   Date Value Ref Range Status   07/24/2023 4.3  Final     Comment:     REF VALUES  DESIRABLE  < 3.4  HIGH RISK  > 5.0       LDL   Date Value Ref Range Status   07/24/2023 109 (H) 0 - 99 mg/dL Final     Comment:     .                           NEAR      BORD      AGE      DESIRABLE  OPTIMAL    HIGH     HIGH     VERY HIGH     0-19 Y     0 - 109     ---    110-129   >/= 130     ----    20-24 Y     0 - 119     ---    120-159   >/= 160     ----      >24 Y     0 -  99   100-129  130-159   160-189     >/=190  .       VLDL   Date Value Ref Range Status   07/24/2023 36 0 - 40 mg/dL Final     Triglycerides   Date Value Ref Range Status   07/24/2023 181 (H) 0 - 149 mg/dL Final     Comment:     .      AGE      DESIRABLE   BORDERLINE HIGH   HIGH     VERY HIGH   0 D-90 D    19 - 174         ----         ----        ----  91 D- 9 Y     0 -  74        75 -  99     >/= 100      ----    10-19 Y     0 -  89        90 - 129     >/= 130      ----    20-24 Y     0 - 114       115 - 149     >/= 150      ----         >24 Y     0 - 149       150 - 199    200- 499    >/= 500  .   Venipuncture immediately after or during the    administration of Metamizole may lead to falsely   low results. Testing should be performed immediately   prior to Metamizole dosing.         HgbA1c:  Hemoglobin A1C   Date Value Ref Range Status   07/24/2023 7.5 (A) % Final     Comment:          Diagnosis of Diabetes-Adults   Non-Diabetic: < or = 5.6%   Increased risk for developing diabetes: 5.7-6.4%   Diagnostic of diabetes: > or = 6.5%  .       Monitoring of Diabetes                Age (y)     Therapeutic Goal (%)   Adults:          >18           <7.0   Pediatrics:    13-18           <7.5                   7-12           <8.0                   0- 6            7.5-8.5   American Diabetes Association.  Diabetes Care 33(S1), Jan 2010.         The 10-year ASCVD risk score (Osmin DK, et al., 2019) is: 43.7%    Values used to calculate the score:      Age: 73 years      Sex: Male      Is Non- : Yes      Diabetic: Yes      Tobacco smoker: No      Systolic Blood Pressure: 142 mmHg      Is BP treated: Yes      HDL Cholesterol: 43.8 mg/dL      Total Cholesterol: 189 mg/dL    ASSESSMENT / PLAN:   1. HIV  --stable on Descovy/TIvacy  --repeat labs today     2. Hx of prostate CA  --s/p resection, stable  --regularly follows with urology     3. DM  --uncontrolled, Hba1c 7.5  --continue lantus, jardiance  --f/up endocrin     4. HTN/HLD/CAD  --continue current meds  --follow up with cardiology     5. CKD  --stable  --follow with nephrology    6. Wellness check  --UTD  --RTC in 6 mo       Viral Quintanilla MD MPH

## 2024-03-14 LAB
CD3+CD4+ CELLS # BLD: 1.33 X10E9/L
CD3+CD4+ CELLS # BLD: 1326 /MM3
CD3+CD4+ CELLS NFR BLD: 39 %
CD3+CD4+ CELLS/CD3+CD8+ CLL BLD: 1.11 %
CD3+CD8+ CELLS # BLD: 1.19 X10E9/L
CD3+CD8+ CELLS NFR BLD: 35 %
FLOW CYTOMETRY SPECIALIST REVIEW: ABNORMAL
HIV1 RNA # PLAS NAA DL=20: 111 COPIES/ML
HIV1 RNA # PLAS NAA DL=20: DETECTED {COPIES}/ML
HIV1 RNA SPEC NAA+PROBE-LOG#: 2.05 LOG10 CPY/ML
LYMPHOCYTES # SPEC AUTO: 3.4 X10*3/UL

## 2024-03-14 PROCEDURE — RXMED WILLOW AMBULATORY MEDICATION CHARGE

## 2024-03-19 ENCOUNTER — PHARMACY VISIT (OUTPATIENT)
Dept: PHARMACY | Facility: CLINIC | Age: 74
End: 2024-03-19
Payer: MEDICARE

## 2024-03-19 PROCEDURE — RXMED WILLOW AMBULATORY MEDICATION CHARGE

## 2024-03-28 DIAGNOSIS — F32.A ANXIETY AND DEPRESSION: ICD-10-CM

## 2024-03-28 DIAGNOSIS — F41.9 ANXIETY AND DEPRESSION: ICD-10-CM

## 2024-03-28 PROCEDURE — RXMED WILLOW AMBULATORY MEDICATION CHARGE

## 2024-03-28 RX ORDER — SERTRALINE HYDROCHLORIDE 50 MG/1
TABLET, FILM COATED ORAL
Qty: 360 TABLET | Refills: 0 | Status: SHIPPED | OUTPATIENT
Start: 2024-03-28

## 2024-04-02 PROCEDURE — RXMED WILLOW AMBULATORY MEDICATION CHARGE

## 2024-04-03 ENCOUNTER — PHARMACY VISIT (OUTPATIENT)
Dept: PHARMACY | Facility: CLINIC | Age: 74
End: 2024-04-03
Payer: MEDICARE

## 2024-04-16 PROCEDURE — RXMED WILLOW AMBULATORY MEDICATION CHARGE

## 2024-04-17 ENCOUNTER — PHARMACY VISIT (OUTPATIENT)
Dept: PHARMACY | Facility: CLINIC | Age: 74
End: 2024-04-17
Payer: MEDICARE

## 2024-04-30 ENCOUNTER — PHARMACY VISIT (OUTPATIENT)
Dept: PHARMACY | Facility: CLINIC | Age: 74
End: 2024-04-30
Payer: MEDICARE

## 2024-04-30 PROCEDURE — RXMED WILLOW AMBULATORY MEDICATION CHARGE

## 2024-05-06 PROCEDURE — RXMED WILLOW AMBULATORY MEDICATION CHARGE

## 2024-05-08 ENCOUNTER — APPOINTMENT (OUTPATIENT)
Dept: NEUROLOGY | Facility: HOSPITAL | Age: 74
End: 2024-05-08
Payer: MEDICARE

## 2024-05-08 ENCOUNTER — PHARMACY VISIT (OUTPATIENT)
Dept: PHARMACY | Facility: CLINIC | Age: 74
End: 2024-05-08
Payer: MEDICARE

## 2024-05-20 DIAGNOSIS — B20 HUMAN IMMUNODEFICIENCY VIRUS (MULTI): ICD-10-CM

## 2024-05-20 PROCEDURE — RXMED WILLOW AMBULATORY MEDICATION CHARGE

## 2024-05-21 DIAGNOSIS — B20 HUMAN IMMUNODEFICIENCY VIRUS (MULTI): ICD-10-CM

## 2024-05-21 RX ORDER — EMTRICITABINE AND TENOFOVIR ALAFENAMIDE 200; 25 MG/1; MG/1
1 TABLET ORAL DAILY
Qty: 30 TABLET | Refills: 5 | Status: SHIPPED | OUTPATIENT
Start: 2024-05-21

## 2024-05-22 ENCOUNTER — PHARMACY VISIT (OUTPATIENT)
Dept: PHARMACY | Facility: CLINIC | Age: 74
End: 2024-05-22
Payer: MEDICARE

## 2024-05-23 ENCOUNTER — APPOINTMENT (OUTPATIENT)
Dept: ENDOCRINOLOGY | Facility: CLINIC | Age: 74
End: 2024-05-23
Payer: MEDICARE

## 2024-05-31 DIAGNOSIS — B20 HUMAN IMMUNODEFICIENCY VIRUS (MULTI): ICD-10-CM

## 2024-05-31 PROCEDURE — RXMED WILLOW AMBULATORY MEDICATION CHARGE

## 2024-05-31 RX ORDER — INSULIN GLARGINE 100 [IU]/ML
INJECTION, SOLUTION SUBCUTANEOUS
Qty: 45 ML | Refills: 2 | Status: CANCELLED | OUTPATIENT
Start: 2024-05-31 | End: 2025-05-31

## 2024-05-31 NOTE — TELEPHONE ENCOUNTER
Patient called for refill on Tivicay.  RN saw that a script was sent to [facility] on 5/21.  Per patient, script was not received.  RN called [facility] pharmacy regarding tivicay refill.  Spoke to Forrest Orlando.

## 2024-06-01 ENCOUNTER — PHARMACY VISIT (OUTPATIENT)
Dept: PHARMACY | Facility: CLINIC | Age: 74
End: 2024-06-01
Payer: MEDICARE

## 2024-06-01 RX ORDER — INSULIN GLARGINE 100 [IU]/ML
INJECTION, SOLUTION SUBCUTANEOUS
Qty: 45 ML | Refills: 2 | Status: CANCELLED | OUTPATIENT
Start: 2024-06-01 | End: 2025-06-01

## 2024-06-08 PROCEDURE — RXMED WILLOW AMBULATORY MEDICATION CHARGE

## 2024-06-11 ENCOUNTER — OFFICE VISIT (OUTPATIENT)
Dept: NEUROLOGY | Facility: CLINIC | Age: 74
End: 2024-06-11
Payer: MEDICARE

## 2024-06-11 ENCOUNTER — PHARMACY VISIT (OUTPATIENT)
Dept: PHARMACY | Facility: CLINIC | Age: 74
End: 2024-06-11
Payer: MEDICARE

## 2024-06-11 VITALS
HEIGHT: 67 IN | DIASTOLIC BLOOD PRESSURE: 92 MMHG | BODY MASS INDEX: 33.77 KG/M2 | SYSTOLIC BLOOD PRESSURE: 158 MMHG | HEART RATE: 60 BPM

## 2024-06-11 DIAGNOSIS — R41.3 COMPLAINTS OF MEMORY DISTURBANCE: ICD-10-CM

## 2024-06-11 DIAGNOSIS — G44.219 EPISODIC TENSION-TYPE HEADACHE, NOT INTRACTABLE: Primary | ICD-10-CM

## 2024-06-11 DIAGNOSIS — G56.03 BILATERAL CARPAL TUNNEL SYNDROME: ICD-10-CM

## 2024-06-11 PROCEDURE — 1159F MED LIST DOCD IN RCRD: CPT | Performed by: PSYCHIATRY & NEUROLOGY

## 2024-06-11 PROCEDURE — 1160F RVW MEDS BY RX/DR IN RCRD: CPT | Performed by: PSYCHIATRY & NEUROLOGY

## 2024-06-11 PROCEDURE — 3077F SYST BP >= 140 MM HG: CPT | Performed by: PSYCHIATRY & NEUROLOGY

## 2024-06-11 PROCEDURE — 99214 OFFICE O/P EST MOD 30 MIN: CPT | Performed by: PSYCHIATRY & NEUROLOGY

## 2024-06-11 PROCEDURE — 1036F TOBACCO NON-USER: CPT | Performed by: PSYCHIATRY & NEUROLOGY

## 2024-06-11 PROCEDURE — 3080F DIAST BP >= 90 MM HG: CPT | Performed by: PSYCHIATRY & NEUROLOGY
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
## 2024-06-11 NOTE — PATIENT INSTRUCTIONS
I'm glad to hear that your headaches have significantly improved.  I think this may be due to riboflavin and I recommend you continue 400 mg daily.    As you indicated, you are planning to reschedule the sleep study and I think this is a good idea.  Disrupted overnight sleep could be a significant factor in your memory/concentration issues.  I do not get an impression of dementia from your cognitive testing today.    Continue night splinting the left wrist for mild carpal tunnel syndrome.    Your blood pressure is elevated today.  As we discussed, it is important to establish a primary doctor who can assist you with monitoring and management of blood pressure.    Please see me in the office in 8 months.

## 2024-06-14 ENCOUNTER — TELEPHONE (OUTPATIENT)
Dept: ENDOCRINOLOGY | Facility: CLINIC | Age: 74
End: 2024-06-14
Payer: MEDICARE

## 2024-06-14 DIAGNOSIS — B20 HUMAN IMMUNODEFICIENCY VIRUS (MULTI): ICD-10-CM

## 2024-06-14 DIAGNOSIS — Z79.4 TYPE 2 DIABETES MELLITUS WITHOUT COMPLICATION, WITH LONG-TERM CURRENT USE OF INSULIN (MULTI): ICD-10-CM

## 2024-06-14 DIAGNOSIS — E11.9 TYPE 2 DIABETES MELLITUS WITHOUT COMPLICATION, WITH LONG-TERM CURRENT USE OF INSULIN (MULTI): ICD-10-CM

## 2024-06-14 DIAGNOSIS — E13.9 DIABETES 1.5, MANAGED AS TYPE 2 (MULTI): ICD-10-CM

## 2024-06-14 PROCEDURE — RXMED WILLOW AMBULATORY MEDICATION CHARGE

## 2024-06-14 RX ORDER — EMTRICITABINE AND TENOFOVIR ALAFENAMIDE 200; 25 MG/1; MG/1
1 TABLET ORAL DAILY
Qty: 30 TABLET | Refills: 5 | OUTPATIENT
Start: 2024-06-14

## 2024-06-14 RX ORDER — INSULIN GLARGINE 100 [IU]/ML
INJECTION, SOLUTION SUBCUTANEOUS
Qty: 15 ML | Refills: 2 | Status: SHIPPED | OUTPATIENT
Start: 2024-06-14

## 2024-06-14 NOTE — TELEPHONE ENCOUNTER
Patient's endocrinologist left .  Patient does not have an appointment with a new MD until August.  Dr. Quintanilla ok'd fill of insulin until appointment

## 2024-06-15 ENCOUNTER — PHARMACY VISIT (OUTPATIENT)
Dept: PHARMACY | Facility: CLINIC | Age: 74
End: 2024-06-15
Payer: MEDICARE

## 2024-06-15 PROCEDURE — RXMED WILLOW AMBULATORY MEDICATION CHARGE

## 2024-06-19 DIAGNOSIS — F32.A ANXIETY AND DEPRESSION: ICD-10-CM

## 2024-06-19 DIAGNOSIS — F41.9 ANXIETY AND DEPRESSION: ICD-10-CM

## 2024-06-19 PROCEDURE — RXMED WILLOW AMBULATORY MEDICATION CHARGE

## 2024-06-19 RX ORDER — SERTRALINE HYDROCHLORIDE 50 MG/1
TABLET, FILM COATED ORAL
Qty: 120 TABLET | Refills: 0 | Status: SHIPPED | OUTPATIENT
Start: 2024-06-20 | End: 2024-07-20

## 2024-06-20 ENCOUNTER — PHARMACY VISIT (OUTPATIENT)
Dept: PHARMACY | Facility: CLINIC | Age: 74
End: 2024-06-20
Payer: MEDICARE

## 2024-06-20 PROCEDURE — RXMED WILLOW AMBULATORY MEDICATION CHARGE

## 2024-06-21 PROCEDURE — RXMED WILLOW AMBULATORY MEDICATION CHARGE

## 2024-06-22 ENCOUNTER — PHARMACY VISIT (OUTPATIENT)
Dept: PHARMACY | Facility: CLINIC | Age: 74
End: 2024-06-22
Payer: MEDICARE

## 2024-06-25 PROCEDURE — RXMED WILLOW AMBULATORY MEDICATION CHARGE

## 2024-07-01 ENCOUNTER — PHARMACY VISIT (OUTPATIENT)
Dept: PHARMACY | Facility: CLINIC | Age: 74
End: 2024-07-01
Payer: MEDICARE

## 2024-07-05 PROCEDURE — RXMED WILLOW AMBULATORY MEDICATION CHARGE

## 2024-07-06 ENCOUNTER — PHARMACY VISIT (OUTPATIENT)
Dept: PHARMACY | Facility: CLINIC | Age: 74
End: 2024-07-06
Payer: MEDICARE

## 2024-07-08 DIAGNOSIS — F41.8 DEPRESSION WITH ANXIETY: ICD-10-CM

## 2024-07-08 PROCEDURE — RXMED WILLOW AMBULATORY MEDICATION CHARGE

## 2024-07-11 PROCEDURE — RXMED WILLOW AMBULATORY MEDICATION CHARGE

## 2024-07-12 ENCOUNTER — APPOINTMENT (OUTPATIENT)
Dept: OPHTHALMOLOGY | Facility: CLINIC | Age: 74
End: 2024-07-12
Payer: MEDICARE

## 2024-07-12 DIAGNOSIS — H25.813 COMBINED FORMS OF AGE-RELATED CATARACT, BILATERAL: ICD-10-CM

## 2024-07-12 DIAGNOSIS — E11.9 CONTROLLED TYPE 2 DIABETES MELLITUS WITHOUT COMPLICATION, UNSPECIFIED WHETHER LONG TERM INSULIN USE (MULTI): Primary | ICD-10-CM

## 2024-07-12 DIAGNOSIS — H04.129 DRY EYE: ICD-10-CM

## 2024-07-12 PROCEDURE — 99213 OFFICE O/P EST LOW 20 MIN: CPT | Performed by: OPHTHALMOLOGY

## 2024-07-12 ASSESSMENT — SLIT LAMP EXAM - LIDS
COMMENTS: GOOD POSITION
COMMENTS: GOOD POSITION

## 2024-07-12 ASSESSMENT — REFRACTION_WEARINGRX
OD_AXIS: 180
OD_SPHERE: -0.75
OS_CYLINDER: -0.50
OD_ADD: +2.75
OS_AXIS: 180
OS_SPHERE: -1.25
OD_CYLINDER: -1.25
OS_ADD: +2.75

## 2024-07-12 ASSESSMENT — VISUAL ACUITY
METHOD: SNELLEN - LINEAR
CORRECTION_TYPE: GLASSES
OS_CC: 20/20-2
OD_CC: 20/20-2

## 2024-07-12 ASSESSMENT — REFRACTION_MANIFEST
OD_AXIS: 180
OS_SPHERE: -1.25
OS_AXIS: 180
OD_CYLINDER: -1.25
OS_CYLINDER: -0.50
OD_ADD: +2.75
OD_SPHERE: -1.25
OS_ADD: +2.75

## 2024-07-12 ASSESSMENT — EXTERNAL EXAM - RIGHT EYE: OD_EXAM: NORMAL

## 2024-07-12 ASSESSMENT — TONOMETRY
OD_IOP_MMHG: 11
IOP_METHOD: GOLDMANN APPLANATION
OS_IOP_MMHG: 11

## 2024-07-12 ASSESSMENT — CUP TO DISC RATIO
OS_RATIO: .4
OD_RATIO: .4

## 2024-07-12 ASSESSMENT — EXTERNAL EXAM - LEFT EYE: OS_EXAM: NORMAL

## 2024-07-12 NOTE — PROGRESS NOTES
Assessment/Plan   Diagnoses and all orders for this visit:  Controlled type 2 diabetes mellitus without complication, unspecified whether long term insulin use (Multi)  no retinopathy observed on exam today od/os, pt ed to continue good BGlc, blood pressure and lipid control, rtc with any changes in vision, otherwise monitor 1 year   Combined forms of age-related cataract, bilateral  Cataract not visually significant at this time. Discussed cataract surgery indications,20/50 or worse, glare dropping vision 2 lines or more and affecting activities of daily living  Observe for progression   Dry eye  Tears qid, warm compresses

## 2024-07-13 ENCOUNTER — PHARMACY VISIT (OUTPATIENT)
Dept: PHARMACY | Facility: CLINIC | Age: 74
End: 2024-07-13
Payer: MEDICARE

## 2024-07-17 ENCOUNTER — PHARMACY VISIT (OUTPATIENT)
Dept: PHARMACY | Facility: CLINIC | Age: 74
End: 2024-07-17
Payer: MEDICARE

## 2024-07-17 PROCEDURE — RXMED WILLOW AMBULATORY MEDICATION CHARGE

## 2024-07-26 ENCOUNTER — APPOINTMENT (OUTPATIENT)
Dept: BEHAVIORAL HEALTH | Facility: CLINIC | Age: 74
End: 2024-07-26
Payer: MEDICARE

## 2024-07-26 ENCOUNTER — PROCEDURE VISIT (OUTPATIENT)
Dept: SLEEP MEDICINE | Facility: CLINIC | Age: 74
End: 2024-07-26
Payer: MEDICARE

## 2024-07-26 DIAGNOSIS — R29.818 SUSPECTED SLEEP APNEA: ICD-10-CM

## 2024-07-27 NOTE — PROGRESS NOTES
[facility] TECH NOTE:     Patient: Thompson Carranza   MRN//AGE: [id number]  1950  73 y.o.   Technologist: Lorraine Snyder   Room: 440B   Service Date: 2024        Sleep Testing Location: [facility]:     TECHNOLOGIST SLEEP STUDY PROCEDURE NOTE:   This sleep study is being conducted according to the policies and procedures outlined by the AAS accreditation standards.  The sleep study procedure and processes involved during this appointment was explained to the patient/patient’s family, questions were answered. The patient/family verbalized understanding.      The patient is a 73 y.o. year old male scheduled for aDiagnostic PSG Split night with montage of:   PSG Master  . he arrived for his appointment.      The study that was ultimately completed was aDiagnostic PSG Split night with montage of:   PSG Master .    The full study Was completed.  Patient questionnaires completed?: yes     Consents signed? yes    Initial Fall Risk Screening:     Thompson has not fallen in the last 6 months. his did not result in injury. Thompson does not have a fear of falling. He does not need assistance with sitting, standing, or walking. he does not need assistance walking in his home. he does not need assistance in an unfamiliar setting. The patient is notusing an assistive device.     Brief Study observations: PT qualified for split night d/t AHI > 30 CMS     Deviation to order/protocol and reason: N/A      If PAP, which was preferred mask/pressure/mode: F&P Eson2 LRG / 14/6 cmH2O BR 16 / BiPAP ST      Other:None    After the procedure, the patient/family was informed to ensure followup with ordering clinician for testing results.      Technologist: Lorraine Snyder

## 2024-07-29 PROCEDURE — RXMED WILLOW AMBULATORY MEDICATION CHARGE

## 2024-07-30 ENCOUNTER — PHARMACY VISIT (OUTPATIENT)
Dept: PHARMACY | Facility: CLINIC | Age: 74
End: 2024-07-30
Payer: MEDICARE

## 2024-08-01 ENCOUNTER — APPOINTMENT (OUTPATIENT)
Dept: PRIMARY CARE | Facility: CLINIC | Age: 74
End: 2024-08-01
Payer: MEDICARE

## 2024-08-02 ENCOUNTER — APPOINTMENT (OUTPATIENT)
Dept: BEHAVIORAL HEALTH | Facility: CLINIC | Age: 74
End: 2024-08-02
Payer: MEDICARE

## 2024-08-02 DIAGNOSIS — F41.8 MIXED ANXIETY AND DEPRESSIVE DISORDER: ICD-10-CM

## 2024-08-02 PROCEDURE — 99214 OFFICE O/P EST MOD 30 MIN: CPT | Performed by: PSYCHIATRY & NEUROLOGY

## 2024-08-02 PROCEDURE — 1159F MED LIST DOCD IN RCRD: CPT | Performed by: PSYCHIATRY & NEUROLOGY

## 2024-08-02 PROCEDURE — 1160F RVW MEDS BY RX/DR IN RCRD: CPT | Performed by: PSYCHIATRY & NEUROLOGY

## 2024-08-02 SDOH — ECONOMIC STABILITY: INCOME INSECURITY: IN THE LAST 12 MONTHS, WAS THERE A TIME WHEN YOU WERE NOT ABLE TO PAY THE MORTGAGE OR RENT ON TIME?: NO

## 2024-08-02 SDOH — ECONOMIC STABILITY: FOOD INSECURITY: WITHIN THE PAST 12 MONTHS, YOU WORRIED THAT YOUR FOOD WOULD RUN OUT BEFORE YOU GOT MONEY TO BUY MORE.: NEVER TRUE

## 2024-08-02 SDOH — HEALTH STABILITY: PHYSICAL HEALTH: ON AVERAGE, HOW MANY DAYS PER WEEK DO YOU ENGAGE IN MODERATE TO STRENUOUS EXERCISE (LIKE A BRISK WALK)?: 7 DAYS

## 2024-08-02 SDOH — ECONOMIC STABILITY: TRANSPORTATION INSECURITY
IN THE PAST 12 MONTHS, HAS LACK OF TRANSPORTATION KEPT YOU FROM MEETINGS, WORK, OR FROM GETTING THINGS NEEDED FOR DAILY LIVING?: NO

## 2024-08-02 SDOH — ECONOMIC STABILITY: FOOD INSECURITY: WITHIN THE PAST 12 MONTHS, THE FOOD YOU BOUGHT JUST DIDN'T LAST AND YOU DIDN'T HAVE MONEY TO GET MORE.: NEVER TRUE

## 2024-08-02 SDOH — ECONOMIC STABILITY: TRANSPORTATION INSECURITY
IN THE PAST 12 MONTHS, HAS THE LACK OF TRANSPORTATION KEPT YOU FROM MEDICAL APPOINTMENTS OR FROM GETTING MEDICATIONS?: NO

## 2024-08-02 SDOH — HEALTH STABILITY: PHYSICAL HEALTH: ON AVERAGE, HOW MANY MINUTES DO YOU ENGAGE IN EXERCISE AT THIS LEVEL?: 10 MIN

## 2024-08-02 ASSESSMENT — SOCIAL DETERMINANTS OF HEALTH (SDOH)
WITHIN THE LAST YEAR, HAVE YOU BEEN AFRAID OF YOUR PARTNER OR EX-PARTNER?: NO
HOW OFTEN DO YOU GET TOGETHER WITH FRIENDS OR RELATIVES?: MORE THAN THREE TIMES A WEEK
IN A TYPICAL WEEK, HOW MANY TIMES DO YOU TALK ON THE PHONE WITH FAMILY, FRIENDS, OR NEIGHBORS?: MORE THAN THREE TIMES A WEEK
IN THE PAST 12 MONTHS, HAS THE ELECTRIC, GAS, OIL, OR WATER COMPANY THREATENED TO SHUT OFF SERVICE IN YOUR HOME?: NO
HOW OFTEN DO YOU ATTENT MEETINGS OF THE CLUB OR ORGANIZATION YOU BELONG TO?: 1 TO 4 TIMES PER YEAR
WITHIN THE LAST YEAR, HAVE YOU BEEN KICKED, HIT, SLAPPED, OR OTHERWISE PHYSICALLY HURT BY YOUR PARTNER OR EX-PARTNER?: NO
WITHIN THE LAST YEAR, HAVE YOU BEEN HUMILIATED OR EMOTIONALLY ABUSED IN OTHER WAYS BY YOUR PARTNER OR EX-PARTNER?: NO
HOW OFTEN DO YOU ATTEND CHURCH OR RELIGIOUS SERVICES?: 1 TO 4 TIMES PER YEAR
WITHIN THE LAST YEAR, HAVE TO BEEN RAPED OR FORCED TO HAVE ANY KIND OF SEXUAL ACTIVITY BY YOUR PARTNER OR EX-PARTNER?: NO
HOW HARD IS IT FOR YOU TO PAY FOR THE VERY BASICS LIKE FOOD, HOUSING, MEDICAL CARE, AND HEATING?: NOT HARD AT ALL
ARE YOU MARRIED, WIDOWED, DIVORCED, SEPARATED, NEVER MARRIED, OR LIVING WITH A PARTNER?: NEVER MARRIED
DO YOU BELONG TO ANY CLUBS OR ORGANIZATIONS SUCH AS CHURCH GROUPS UNIONS, FRATERNAL OR ATHLETIC GROUPS, OR SCHOOL GROUPS?: YES

## 2024-08-02 ASSESSMENT — LIFESTYLE VARIABLES
HOW OFTEN DO YOU HAVE A DRINK CONTAINING ALCOHOL: NEVER
HOW OFTEN DO YOU HAVE SIX OR MORE DRINKS ON ONE OCCASION: NEVER
HOW MANY STANDARD DRINKS CONTAINING ALCOHOL DO YOU HAVE ON A TYPICAL DAY: PATIENT DOES NOT DRINK
SKIP TO QUESTIONS 9-10: 1
AUDIT-C TOTAL SCORE: 0

## 2024-08-02 NOTE — PROGRESS NOTES
Subjective   Patient ID: Thompson Carranza is a 73 y.o. male who presents for No chief complaint on file. I am doing well.    The patient engaged in a telehealth session via Epic audio visual or phone with this provider practicing within the [facility]. The identity of the patient was verified by their date of birth and last four digits of their social security number. The provider demonstrated that confidentially was preserved at their location. The patient was informed that they were responsible for ensuring confidentially was secured at their location. The patient's location was documented for emergency purposes. The patient was informed of the necessary steps that would occur if an emergency was to occur or technology failed during session.     HPI: The patient reports that he is doing better now. However, he was evaluated by sleep medicine and he was diagnosed with sleep apnea which is now his focus of care.     Past Medical History:  No date: Cataract  No date: Diabetic retinopathy (Multi)  No date: Disease of salivary gland, unspecified      Comment:  Parotid discomfort  No date: Lipomatosis, not elsewhere classified      Comment:  Lipomatosis  09/11/2015: Nasal congestion      Comment:  Nasal congestion  11/26/2018: Other conditions influencing health status      Comment:  History of cough  10/31/2013: Other conditions influencing health status      Comment:  Exposure To Syphilis  05/14/2019: Other disturbances of smell and taste      Comment:  Decreased sense of smell  No date: Other muscle spasm      Comment:  Spasm of muscle  No date: Other specified dermatitis      Comment:  Spongiotic dermatitis  12/21/2015: Other symptoms and signs involving general sensations and   perceptions      Comment:  Facial pressure  No date: Personal history of diseases of the skin and subcutaneous   tissue      Comment:  History of skin disorder  No date: Personal history of diseases of the skin and subcutaneous   tissue       Comment:  History of folliculitis  No date: Personal history of diseases of the skin and subcutaneous   tissue      Comment:  History of eczema  04/20/2017: Personal history of other diseases of male genital organs      Comment:  History of orchitis  No date: Personal history of other diseases of the nervous system and   sense organs      Comment:  Personal history of otitis media  No date: Personal history of other diseases of the respiratory system      Comment:  History of nasal polyp  02/16/2016: Personal history of other diseases of the respiratory   system      Comment:  History of sinusitis  05/08/2019: Personal history of other diseases of the respiratory   system      Comment:  History of acute sinusitis  02/03/2020: Personal history of other diseases of the respiratory   system      Comment:  History of sinusitis  05/08/2015: Personal history of other diseases of the respiratory   system      Comment:  History of chronic sinusitis  07/29/2017: Personal history of other diseases of the respiratory   system      Comment:  History of chronic rhinitis  No date: Personal history of other diseases of urinary system      Comment:  History of hematuria  No date: Personal history of other diseases of urinary system      Comment:  Personal history of renal failure  No date: Personal history of other endocrine, nutritional and   metabolic disease      Comment:  History of hypoglycemia  No date: Personal history of other infectious and parasitic diseases      Comment:  History of scabies  No date: Personal history of other infectious and parasitic diseases      Comment:  History of candidiasis of mouth  No date: Personal history of other infectious and parasitic diseases      Comment:  History of herpes zoster  No date: Personal history of other specified conditions      Comment:  History of lymphadenopathy  11/06/2017: Personal history of other specified conditions      Comment:  History of facial pain  03/24/2017:  Personal history of other specified conditions      Comment:  History of postnasal drip  12/07/2021: Personal history of other specified conditions      Comment:  History of abdominal pain  No date: Personal history of pneumonia (recurrent)      Comment:  History of pneumonia  No date: Personal history of pneumonia (recurrent)      Comment:  History of pneumonia  No date: Personal history of urinary (tract) infections      Comment:  Personal history of urinary tract infection  06/06/2014: Spondylosis without myelopathy or radiculopathy, cervical   region      Comment:  Cervical spondylosis  11/27/2013: Trigger thumb, left thumb      Comment:  Trigger thumb of left hand  No date: Unspecified otitis externa, left ear      Comment:  Left otitis externa  04/28/2017: Unspecified symptoms and signs involving the   genitourinary system      Comment:  UTI symptoms  08/31/2022: Unspecified urinary incontinence      Comment:  Urinary incontinence.    Family History  Mother    · Family history of Alcohol abuse   · Family history of depression (V17.0) (Z81.8)   · Family history of diabetes mellitus (V18.0) (Z83.3)  Father    · Family history of Alcohol ingestion   · Family history of cerebrovascular accident (CVA) (V17.1) (Z82.3)  Sister    · Family history of Anxiety   · Family history of CAD, multiple vessel   · Family history of diabetes mellitus (V18.0) (Z83.3)   · Family history of senile dementia (V17.2) (Z81.8)  Brother    · Family history of dementia (V17.2) (Z81.8)  Paternal Cousin    · Family history of dementia (V17.2) (Z81.8)     Social History  Problems    · Activities of daily living (ADL's), independent   · Born in [place]   · Completed college, bachelors degree   · English degree was obtained.   · Completed elementary school   · Completed graduate school, masters degree   · Was in human services.   · Completed high school   · Does not use illicit drugs (V49.89) (Z78.9)   · Employed   · Ex-cigarette smoker  (V15.82) (Z87.891)   · Has no children   · Minimum alcohol consumption   · Never    · No alcohol use   · Person living alone (V60.3) (Z60.2)   · Retired from employment   · Worked  from 1993 through 2018.   · Sexually active with members of same gender   · Tobacco Non-user     Allergies  Medication    · ritonavir   metallic taste; Recorded By: Brandie Shaffer; 4/10/2014 11:48:06 AM  NonMedication       MSE: The patient is alert, fully oriented, language is intact, and recent and remote memory intact. The patient denies any suicidal or homicidal ideation or plans. The patient presents with no depressive, manic or psychotic symptoms. Thought is logical and clear. No disturbances of judgment or insight are exhibited. No behavioral disturbances are present on examination.          Review of Systems   Neurological:         MSE: The patient is alert, fully oriented, language is intact, and recent and remote memory intact. The patient denies any suicidal or homicidal ideation or plans. The patient presents with no depressive, manic or psychotic symptoms. Thought is logical and clear. No disturbances of judgment or insight are exhibited. No behavioral disturbances are present on examination.     Psychiatric/Behavioral:          MSE: The patient is alert, fully oriented, language is intact, and recent and remote memory intact. The patient denies any suicidal or homicidal ideation or plans. The patient presents with no depressive, manic or psychotic symptoms. Thought is logical and clear. No disturbances of judgment or insight are exhibited. No behavioral disturbances are present on examination.        Psych Review of Symptoms    Objective   Physical Exam  Neurological:      General: No focal deficit present.      Mental Status: He is oriented to person, place, and time. Mental status is at baseline.      Comments: MSE: The patient is alert, fully oriented, language is intact, and recent and remote  memory intact. The patient denies any suicidal or homicidal ideation or plans. The patient presents with no depressive, manic or psychotic symptoms. Thought is logical and clear. No disturbances of judgment or insight are exhibited. No behavioral disturbances are present on examination.     Psychiatric:         Mood and Affect: Mood normal.         Behavior: Behavior normal.         Thought Content: Thought content normal.         Judgment: Judgment normal.      Comments: MSE: The patient is alert, fully oriented, language is intact, and recent and remote memory intact. The patient denies any suicidal or homicidal ideation or plans. The patient presents with no depressive, manic or psychotic symptoms. Thought is logical and clear. No disturbances of judgment or insight are exhibited. No behavioral disturbances are present on examination.           Lab Review:   No visits with results within 2 Month(s) from this visit.   Latest known visit with results is:   Office Visit on 03/13/2024   Component Date Value    WBC 03/13/2024 4.9     nRBC 03/13/2024 0.0     RBC 03/13/2024 6.34 (H)     Hemoglobin 03/13/2024 16.4     Hematocrit 03/13/2024 53.5 (H)     MCV 03/13/2024 84     MCH 03/13/2024 25.9 (L)     MCHC 03/13/2024 30.7 (L)     RDW 03/13/2024 14.7 (H)     Platelets 03/13/2024 226     Neutrophils % 03/13/2024 16.6     Immature Granulocytes %,* 03/13/2024 0.2     Lymphocytes % 03/13/2024 69.8     Monocytes % 03/13/2024 10.5     Eosinophils % 03/13/2024 2.5     Basophils % 03/13/2024 0.4     Neutrophils Absolute 03/13/2024 0.81 (L)     Immature Granulocytes Ab* 03/13/2024 0.01     Lymphocytes Absolute 03/13/2024 3.40 (H)     Monocytes Absolute 03/13/2024 0.51     Eosinophils Absolute 03/13/2024 0.12     Basophils Absolute 03/13/2024 0.02     Glucose 03/13/2024 63 (L)     Sodium 03/13/2024 140     Potassium 03/13/2024 4.4     Chloride 03/13/2024 105     Bicarbonate 03/13/2024 24     Anion Gap 03/13/2024 15     Urea  Nitrogen 03/13/2024 20     Creatinine 03/13/2024 1.37 (H)     eGFR 03/13/2024 54 (L)     Calcium 03/13/2024 9.7     Albumin 03/13/2024 4.3     Alkaline Phosphatase 03/13/2024 68     Total Protein 03/13/2024 7.3     AST 03/13/2024 24     Bilirubin, Total 03/13/2024 0.4     ALT 03/13/2024 33     HIV-1 RNA Viral Load 03/13/2024 111 (H)     HIV-1 RNA PCR Viral Load* 03/13/2024 2.05     HIV RNA Result 03/13/2024 Detected (A)     Lymphocyte Absolute 03/13/2024 3.40     CD3+CD4+% 03/13/2024 39     CD3+CD4+ Absolute 03/13/2024 1.326     CD3+CD8+% 03/13/2024 35 (H)     CD3+CD8+ Absolute 03/13/2024 1.190     CD4/CD8 Ratio 03/13/2024 1.11     Interpretation, CD4/8 03/13/2024 There are 5 % dual positive CD4/CD8 T-lymphocytes, which are predominantly CD4+ with low expression of CD8.     CD3+CD4+ Absolute (/mm3) 03/13/2024 1,326        Assessment/Plan   Psychiatric Risk Assessment  Violence Risk Assessment: none  Acute Risk of Harm to Others is Considered: low   Suicide Risk Assessment: age > 65 yrs old and   Protective Factors against Suicide: adherence to  treatment, fear of suicide, moral objections to suicide, positive family relationships, and sense of responsibility toward family  Acute Risk of Harm to Self is Considered: low    Imminent Risk of Suicide or Serious Self-Injury: Low   Chronic Risk of Suicide of Serious Self-Injury: Low  Risk factors: Age, depression history and   Protective factors: Denies current suicidal ideation, denies history of suicide attempts , willingness to seek help and support , gender, access to a variety of clinical interventions , and receiving and engaged in care for mental, physical, and substance use disorders      Imminent Risk of Violence or Homicide: Low   Risk Factors: No significant risk factors identified on screening  Protective Factors: Lack of known history of harm to others , Lack of known history of violent ideation , and lack of known access to firearms.     Diagnosis:  anxiety and depression in substantial remission.    Treatment plan: The plan is to return as needed in the future.     Pursue treatment with sleep medicine for your apnea.

## 2024-08-05 DIAGNOSIS — I50.22 HEART FAILURE, SYSTOLIC, CHRONIC (MULTI): Primary | ICD-10-CM

## 2024-08-07 ENCOUNTER — PHARMACY VISIT (OUTPATIENT)
Dept: PHARMACY | Facility: CLINIC | Age: 74
End: 2024-08-07
Payer: MEDICARE

## 2024-08-07 PROCEDURE — RXMED WILLOW AMBULATORY MEDICATION CHARGE

## 2024-08-09 PROCEDURE — RXMED WILLOW AMBULATORY MEDICATION CHARGE

## 2024-08-12 ENCOUNTER — PHARMACY VISIT (OUTPATIENT)
Dept: PHARMACY | Facility: CLINIC | Age: 74
End: 2024-08-12
Payer: MEDICARE

## 2024-08-13 ENCOUNTER — APPOINTMENT (OUTPATIENT)
Dept: PRIMARY CARE | Facility: CLINIC | Age: 74
End: 2024-08-13
Payer: MEDICARE

## 2024-08-13 VITALS
DIASTOLIC BLOOD PRESSURE: 80 MMHG | HEART RATE: 67 BPM | SYSTOLIC BLOOD PRESSURE: 122 MMHG | OXYGEN SATURATION: 97 % | WEIGHT: 217.5 LBS | TEMPERATURE: 97 F | BODY MASS INDEX: 34.07 KG/M2

## 2024-08-13 DIAGNOSIS — R31.21 ASYMPTOMATIC MICROSCOPIC HEMATURIA: ICD-10-CM

## 2024-08-13 DIAGNOSIS — C61 MALIGNANT NEOPLASM OF PROSTATE (MULTI): ICD-10-CM

## 2024-08-13 DIAGNOSIS — Z12.5 SCREENING PSA (PROSTATE SPECIFIC ANTIGEN): ICD-10-CM

## 2024-08-13 DIAGNOSIS — E11.22 TYPE 2 DIABETES MELLITUS WITH STAGE 3A CHRONIC KIDNEY DISEASE, UNSPECIFIED WHETHER LONG TERM INSULIN USE (MULTI): ICD-10-CM

## 2024-08-13 DIAGNOSIS — E78.2 MIXED HYPERLIPIDEMIA: ICD-10-CM

## 2024-08-13 DIAGNOSIS — Z76.89 ENCOUNTER TO ESTABLISH CARE WITH NEW DOCTOR: Primary | ICD-10-CM

## 2024-08-13 DIAGNOSIS — I48.91 ATRIAL FIBRILLATION, UNSPECIFIED TYPE (MULTI): ICD-10-CM

## 2024-08-13 DIAGNOSIS — I50.22 HEART FAILURE, SYSTOLIC, CHRONIC (MULTI): ICD-10-CM

## 2024-08-13 DIAGNOSIS — I10 PRIMARY HYPERTENSION: ICD-10-CM

## 2024-08-13 DIAGNOSIS — D64.9 ANEMIA, UNSPECIFIED TYPE: ICD-10-CM

## 2024-08-13 DIAGNOSIS — E66.01 MORBID OBESITY (MULTI): ICD-10-CM

## 2024-08-13 DIAGNOSIS — Z79.4 TYPE 2 DIABETES MELLITUS WITH DIABETIC PERIPHERAL ANGIOPATHY WITHOUT GANGRENE, WITH LONG-TERM CURRENT USE OF INSULIN (MULTI): ICD-10-CM

## 2024-08-13 DIAGNOSIS — N18.31 TYPE 2 DIABETES MELLITUS WITH STAGE 3A CHRONIC KIDNEY DISEASE, UNSPECIFIED WHETHER LONG TERM INSULIN USE (MULTI): ICD-10-CM

## 2024-08-13 DIAGNOSIS — E11.51 TYPE 2 DIABETES MELLITUS WITH DIABETIC PERIPHERAL ANGIOPATHY WITHOUT GANGRENE, WITH LONG-TERM CURRENT USE OF INSULIN (MULTI): ICD-10-CM

## 2024-08-13 DIAGNOSIS — R53.83 FATIGUE, UNSPECIFIED TYPE: ICD-10-CM

## 2024-08-13 PROBLEM — M06.9 RA (RHEUMATOID ARTHRITIS) (MULTI): Status: RESOLVED | Noted: 2022-06-08 | Resolved: 2024-08-13

## 2024-08-13 LAB
APPEARANCE UR: CLEAR
BILIRUB UR QL STRIP: NEGATIVE
COLOR UR: YELLOW
GLUCOSE UR STRIP-MCNC: ABNORMAL MG/DL
HGB UR QL STRIP: ABNORMAL
KETONES UR STRIP-MCNC: NEGATIVE MG/DL
LEUKOCYTE ESTERASE UR QL STRIP: NEGATIVE
NITRITE UR QL STRIP: NEGATIVE
PH UR STRIP: 6 [PH]
PROT UR STRIP-MCNC: ABNORMAL MG/DL
SP GR UR STRIP.AUTO: 1.01
UROBILINOGEN UR STRIP-ACNC: 0.2 E.U./DL

## 2024-08-13 PROCEDURE — 85025 COMPLETE CBC W/AUTO DIFF WBC: CPT | Performed by: INTERNAL MEDICINE

## 2024-08-13 PROCEDURE — 80061 LIPID PANEL: CPT | Performed by: INTERNAL MEDICINE

## 2024-08-13 PROCEDURE — 84443 ASSAY THYROID STIM HORMONE: CPT | Performed by: INTERNAL MEDICINE

## 2024-08-13 PROCEDURE — 3074F SYST BP LT 130 MM HG: CPT | Performed by: INTERNAL MEDICINE

## 2024-08-13 PROCEDURE — 80053 COMPREHEN METABOLIC PANEL: CPT | Performed by: INTERNAL MEDICINE

## 2024-08-13 PROCEDURE — 1160F RVW MEDS BY RX/DR IN RCRD: CPT | Performed by: INTERNAL MEDICINE

## 2024-08-13 PROCEDURE — G0103 PSA SCREENING: HCPCS | Performed by: INTERNAL MEDICINE

## 2024-08-13 PROCEDURE — 1036F TOBACCO NON-USER: CPT | Performed by: INTERNAL MEDICINE

## 2024-08-13 PROCEDURE — 83036 HEMOGLOBIN GLYCOSYLATED A1C: CPT | Performed by: INTERNAL MEDICINE

## 2024-08-13 PROCEDURE — 99204 OFFICE O/P NEW MOD 45 MIN: CPT | Performed by: INTERNAL MEDICINE

## 2024-08-13 PROCEDURE — 81003 URINALYSIS AUTO W/O SCOPE: CPT | Performed by: INTERNAL MEDICINE

## 2024-08-13 PROCEDURE — 3079F DIAST BP 80-89 MM HG: CPT | Performed by: INTERNAL MEDICINE

## 2024-08-13 PROCEDURE — 1159F MED LIST DOCD IN RCRD: CPT | Performed by: INTERNAL MEDICINE

## 2024-08-13 ASSESSMENT — ENCOUNTER SYMPTOMS
LOSS OF SENSATION IN FEET: 0
OCCASIONAL FEELINGS OF UNSTEADINESS: 0
DEPRESSION: 1

## 2024-08-13 ASSESSMENT — PATIENT HEALTH QUESTIONNAIRE - PHQ9
SUM OF ALL RESPONSES TO PHQ9 QUESTIONS 1 AND 2: 0
1. LITTLE INTEREST OR PLEASURE IN DOING THINGS: NOT AT ALL
2. FEELING DOWN, DEPRESSED OR HOPELESS: NOT AT ALL

## 2024-08-13 NOTE — PROGRESS NOTES
Subjective   Patient ID: Thompson Carranza is a 73 y.o. male who presents for Establish Care.    HPI   New patient.  73 years old male comes in to see me today with multiple medical issues including diabetes mellitus type 2, CAD, heart failure, atrial fibrillation, defibrillator, hypertension and hyperlipidemia, BPH tension headaches depression and anxiety and finally HIV.  He sees Dr. Dex Dunaway.Marilia, Alma Mendez and new endocrinologist Dr. Greer,   Medications reviewed and reconciled.  He is on Eliquis 5 mg twice a day, Plavix 75 mg a day, Tivicay 50 mg a day, discovy 200/25 mg a day, Jardiance 10 mg a day, Inspra 25 mg a day Zetia 10 mg a day hydralazine 10 mg 3 times a day Basaglar insulin 16 units in the morning and 24 units in the evening, isosorbide 10 mg 3 times a day, metoprolol succinate or Toprol 200 mg once a day, Entresto 97/130 mg 2 times a day.  Hx his blood sugar by using the old-fashioned way on his skin fingers.  Allergic to peanuts when it is mixed with chocolate.  HIV for 37 years under therapy and treatment.  Lives in [place] by himself.  No children.  Non-smoker.  No alcohol sometime and rarely wine  Retired guardian decision making for certain people  Surgically ablation in the past, pacemaker, TAVR incontinence now.  Penile implant  He has a urologist Dr. Diallo.  Took himself off sertraline depression and headaches is gone  Mother and father both  from hypertension and its complication and there 47 and 53 years of age.  3 brothers 1 still living 94 years old the others passed away from Alzheimer and other medical conditions.  5 sisters 3  to still alive doing  .  Review of Systems  12 system review 12 system are negative pain in his knee and his back.  Objective   /80 (BP Location: Right arm, Patient Position: Sitting, BP Cuff Size: Adult)   Pulse 67   Temp 36.1 °C (97 °F) (Temporal)   Wt 98.7 kg (217 lb 8 oz)   SpO2 97%   BMI 34.07 kg/m²     Physical  Exam  Alert oriented in no distress nonicteric sclera no jaundice.  Neck supple no masses no lymph nodes no thyromegaly no jugular venous distention.  Face symmetrical cranial nerves intact.  Lungs clear no rales wheezing or crackles.  Heart normal S1 and S2 regular rhythm.  Abdomen benign distended obese nontender no masses no organomegaly or pain on palpations.  Neurological exam intact move his upper and lower extremities without any limitations.  EpiPen to be ordered for allergy to peanuts.  Updated his medication and start him on some of this new medication for diabetes mellitus after a consult Dr. Toure.  He may qualify for Rybelsus or Ozempic or Mounjaro which can help him lose weight.  Also will qualify for freestyle diane or Dexcom since he is on insulin.  At the next visit will be discussing all of the above.  Assessment/Plan   Diagnoses and all orders for this visit:  Encounter to establish care with new doctor  Anemia, unspecified type  -     CBC  Primary hypertension  -     Comprehensive Metabolic Panel  Mixed hyperlipidemia  -     Lipid Panel  Fatigue, unspecified type  -     Thyroid Stimulating Hormone  Screening PSA (prostate specific antigen)  -     Prostate Specific Antigen, Screen  Asymptomatic microscopic hematuria  -     POCT UA (Automated) docked device  Heart failure, systolic, chronic (Multi)  Atrial fibrillation, unspecified type (Multi)  Type 2 diabetes mellitus with diabetic peripheral angiopathy without gangrene, with long-term current use of insulin (Multi)  Malignant neoplasm of prostate (Multi)  Morbid obesity (Multi)  Type 2 diabetes mellitus with stage 3a chronic kidney disease, unspecified whether long term insulin use (Multi)

## 2024-08-15 PROCEDURE — RXMED WILLOW AMBULATORY MEDICATION CHARGE

## 2024-08-16 ENCOUNTER — TELEPHONE (OUTPATIENT)
Dept: PRIMARY CARE | Facility: CLINIC | Age: 74
End: 2024-08-16
Payer: MEDICARE

## 2024-08-16 DIAGNOSIS — Z79.4 TYPE 2 DIABETES MELLITUS WITH STAGE 3A CHRONIC KIDNEY DISEASE, WITH LONG-TERM CURRENT USE OF INSULIN (MULTI): Primary | ICD-10-CM

## 2024-08-16 DIAGNOSIS — N18.31 TYPE 2 DIABETES MELLITUS WITH STAGE 3A CHRONIC KIDNEY DISEASE, WITH LONG-TERM CURRENT USE OF INSULIN (MULTI): Primary | ICD-10-CM

## 2024-08-16 DIAGNOSIS — E11.22 TYPE 2 DIABETES MELLITUS WITH STAGE 3A CHRONIC KIDNEY DISEASE, WITH LONG-TERM CURRENT USE OF INSULIN (MULTI): Primary | ICD-10-CM

## 2024-08-16 LAB — HEMOGLOBIN A1C/HEMOGLOBIN TOTAL IN BLOOD EXTERNAL: 10 %

## 2024-08-16 NOTE — TELEPHONE ENCOUNTER
----- Message from Yonas Phan sent at 8/16/2024  7:47 AM EDT -----  Regarding: r  Will you please run an A1c on Mr. Thompson Carranza.    He need to follow-up with his infectious disease physician Dr. Viral Quintanilla for abnormal CBC.  His blood sugar 226 pending an A1c.  Kidney function declined to 47.83 from 54.  Electrolytes and liver functions are normal.  Cholesterol and lipid panel is normal including his thyroid test and his PSA all within normal limit.  So we need to adjust his medication for his blood sugar or diabetes and for that we will need to do an A1c on his blood.  He will follow-up with Dr. Greer endocrinologist..  Any questions call us

## 2024-08-18 ENCOUNTER — NURSE TRIAGE (OUTPATIENT)
Dept: PRIMARY CARE | Facility: CLINIC | Age: 74
End: 2024-08-18
Payer: MEDICARE

## 2024-08-19 ENCOUNTER — TELEPHONE (OUTPATIENT)
Dept: PRIMARY CARE | Facility: CLINIC | Age: 74
End: 2024-08-19
Payer: MEDICARE

## 2024-08-19 NOTE — TELEPHONE ENCOUNTER
----- Message from Yonas Phan sent at 8/18/2024  1:17 PM EDT -----  Regarding: results and meds adjustment  A1c never been so high 10.  Need to adjust your medication accordingly and I will advised to increase your Basaglar insulin to 18 units in the morning and to 26 units in the evening, increase your Jardiance from 10 mg a day to 25 mg a day.  May help better control your A1c and your blood sugar.  Come back in the near future at least a month from now to check your blood sugar.  A1c should be done in 3 months which will be around October or November.  Watch your diet always less carbohydrates less sugar and sweets keep exercising stay active.  Any questions call this office

## 2024-08-20 ENCOUNTER — PHARMACY VISIT (OUTPATIENT)
Dept: PHARMACY | Facility: CLINIC | Age: 74
End: 2024-08-20
Payer: MEDICARE

## 2024-08-23 PROCEDURE — RXMED WILLOW AMBULATORY MEDICATION CHARGE

## 2024-08-24 ENCOUNTER — PHARMACY VISIT (OUTPATIENT)
Dept: PHARMACY | Facility: CLINIC | Age: 74
End: 2024-08-24
Payer: MEDICARE

## 2024-08-27 ENCOUNTER — APPOINTMENT (OUTPATIENT)
Dept: ENDOCRINOLOGY | Facility: CLINIC | Age: 74
End: 2024-08-27
Payer: MEDICARE

## 2024-08-27 VITALS
RESPIRATION RATE: 16 BRPM | HEIGHT: 67 IN | SYSTOLIC BLOOD PRESSURE: 124 MMHG | HEART RATE: 78 BPM | WEIGHT: 217.6 LBS | DIASTOLIC BLOOD PRESSURE: 70 MMHG | BODY MASS INDEX: 34.15 KG/M2

## 2024-08-27 DIAGNOSIS — E78.5 HYPERLIPIDEMIA, UNSPECIFIED HYPERLIPIDEMIA TYPE: ICD-10-CM

## 2024-08-27 DIAGNOSIS — I10 HYPERTENSION, UNSPECIFIED TYPE: ICD-10-CM

## 2024-08-27 DIAGNOSIS — E11.65 INADEQUATELY CONTROLLED DIABETES MELLITUS (MULTI): Primary | ICD-10-CM

## 2024-08-27 PROCEDURE — 99204 OFFICE O/P NEW MOD 45 MIN: CPT | Performed by: INTERNAL MEDICINE

## 2024-08-27 PROCEDURE — 1160F RVW MEDS BY RX/DR IN RCRD: CPT | Performed by: INTERNAL MEDICINE

## 2024-08-27 PROCEDURE — 3074F SYST BP LT 130 MM HG: CPT | Performed by: INTERNAL MEDICINE

## 2024-08-27 PROCEDURE — 1036F TOBACCO NON-USER: CPT | Performed by: INTERNAL MEDICINE

## 2024-08-27 PROCEDURE — 3008F BODY MASS INDEX DOCD: CPT | Performed by: INTERNAL MEDICINE

## 2024-08-27 PROCEDURE — 1159F MED LIST DOCD IN RCRD: CPT | Performed by: INTERNAL MEDICINE

## 2024-08-27 PROCEDURE — 3078F DIAST BP <80 MM HG: CPT | Performed by: INTERNAL MEDICINE

## 2024-08-27 RX ORDER — FLASH GLUCOSE SENSOR
KIT MISCELLANEOUS
Qty: 6 EACH | Refills: 1 | Status: SHIPPED | OUTPATIENT
Start: 2024-08-27

## 2024-08-27 RX ORDER — FLASH GLUCOSE SCANNING READER
EACH MISCELLANEOUS
Qty: 1 EACH | Refills: 0 | Status: SHIPPED | OUTPATIENT
Start: 2024-08-27

## 2024-08-27 RX ORDER — SEMAGLUTIDE 0.68 MG/ML
0.5 INJECTION, SOLUTION SUBCUTANEOUS
Qty: 9 ML | Refills: 1 | Status: SHIPPED | OUTPATIENT
Start: 2024-08-27

## 2024-08-27 ASSESSMENT — ENCOUNTER SYMPTOMS
FEVER: 0
VOMITING: 0
CHILLS: 0
NAUSEA: 0
SHORTNESS OF BREATH: 0
DIZZINESS: 0
DIARRHEA: 0
LIGHT-HEADEDNESS: 0
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
## 2024-08-28 ENCOUNTER — PHARMACY VISIT (OUTPATIENT)
Dept: PHARMACY | Facility: CLINIC | Age: 74
End: 2024-08-28
Payer: MEDICARE

## 2024-08-28 PROCEDURE — RXMED WILLOW AMBULATORY MEDICATION CHARGE

## 2024-08-28 NOTE — ASSESSMENT & PLAN NOTE
73-year-old here for evaluation of uncontrolled type 2 diabetes also with hypertension and hyperlipidemia.  We reviewed his course.  We discussed the importance of better blood sugar control.  We discussed options to adjust his regimen.  Given his cardiac history and recent A1c I think the best option would be to start Ozempic.  We discussed dosing and starting with 0.25 once a week for a month and then increasing to 0.5 mg.  He has been on GLP-1 agents in the past and had no GI side effects from them.  We will continue current insulin dosing and Jardiance.  I will also prescribe him a diane.  I will see him back in 3 months.  I encouraged him to work on diet and exercise.  He is to call or message with concerns

## 2024-08-28 NOTE — PATIENT INSTRUCTIONS
Please start Ozempic as discussed and written for you  Continue current insulin and Jardiance  Work on diet and exercise  A diane glucometer system will also be called in for you  Follow-up in 3 months  Please call or message with concerns or questions

## 2024-08-29 ENCOUNTER — OFFICE VISIT (OUTPATIENT)
Dept: CARDIOLOGY | Facility: CLINIC | Age: 74
End: 2024-08-29
Payer: MEDICARE

## 2024-08-29 VITALS
DIASTOLIC BLOOD PRESSURE: 70 MMHG | HEIGHT: 66 IN | OXYGEN SATURATION: 98 % | HEART RATE: 77 BPM | SYSTOLIC BLOOD PRESSURE: 120 MMHG | BODY MASS INDEX: 34.72 KG/M2 | WEIGHT: 216 LBS

## 2024-08-29 DIAGNOSIS — I73.9 CLAUDICATION (CMS-HCC): ICD-10-CM

## 2024-08-29 DIAGNOSIS — I50.22 HEART FAILURE, SYSTOLIC, CHRONIC (MULTI): Primary | ICD-10-CM

## 2024-08-29 PROCEDURE — 99215 OFFICE O/P EST HI 40 MIN: CPT | Performed by: STUDENT IN AN ORGANIZED HEALTH CARE EDUCATION/TRAINING PROGRAM

## 2024-08-29 PROCEDURE — 3074F SYST BP LT 130 MM HG: CPT | Performed by: STUDENT IN AN ORGANIZED HEALTH CARE EDUCATION/TRAINING PROGRAM

## 2024-08-29 PROCEDURE — 1036F TOBACCO NON-USER: CPT | Performed by: STUDENT IN AN ORGANIZED HEALTH CARE EDUCATION/TRAINING PROGRAM

## 2024-08-29 PROCEDURE — 1159F MED LIST DOCD IN RCRD: CPT | Performed by: STUDENT IN AN ORGANIZED HEALTH CARE EDUCATION/TRAINING PROGRAM

## 2024-08-29 PROCEDURE — 3078F DIAST BP <80 MM HG: CPT | Performed by: STUDENT IN AN ORGANIZED HEALTH CARE EDUCATION/TRAINING PROGRAM

## 2024-08-29 ASSESSMENT — ENCOUNTER SYMPTOMS
HEMATOCHEZIA: 0
COLOR CHANGE: 0
SYNCOPE: 0
DIFFICULTY WITH CONCENTRATION: 1
PALPITATIONS: 0
COUGH: 0
DYSPNEA ON EXERTION: 0
HEMATURIA: 0
HALLUCINATIONS: 0
IRREGULAR HEARTBEAT: 0
ORTHOPNEA: 0
ABDOMINAL PAIN: 0
LOSS OF BALANCE: 0
NEAR-SYNCOPE: 0
SPUTUM PRODUCTION: 0
WEAKNESS: 0
ALTERED MENTAL STATUS: 0
FOCAL WEAKNESS: 0
SHORTNESS OF BREATH: 1
FEVER: 0
PND: 0
WEIGHT GAIN: 0
HEMATEMESIS: 0
DECREASED APPETITE: 0
WEIGHT LOSS: 0
WHEEZING: 0
CLAUDICATION: 0

## 2024-08-29 NOTE — PATIENT INSTRUCTIONS
Thank you for coming in today. If you have any questions or concerns, you may call the Heart Failure Office at [phone] option [phone], or [phone].  You may also contact our heart failure nursing team via email on [email or web address].    For quicker results set-up your  Shout account to receive results and other correspondence directly to your phone.    Please bring all your pills/medications to your Cardiology appointments.    **  - No new medication changes today    -We will renew your heart medications today    - Please have the following tests done:  1.Blood tests TODAY  (RFP, BNP, CBC, iron, TIBC, ferritin)    2. I will review the echocardiogram you have scheduled on 9/3/2024    3. Bilateral MIKE  ( rest and exercise) of the legs (to assess for claudication). CALL [phone]    4.  Cardiopulmonary exercise test.  CALL [phone] to schedule this test    - Please make an appointment to be seen in :   1 month   (VIRTUAL)  6 months   (IN OFFICE)
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
## 2024-09-03 ENCOUNTER — LAB (OUTPATIENT)
Dept: LAB | Facility: LAB | Age: 74
End: 2024-09-03
Payer: MEDICAID

## 2024-09-03 DIAGNOSIS — I50.22 HEART FAILURE, SYSTOLIC, CHRONIC (MULTI): ICD-10-CM

## 2024-09-03 LAB
ALBUMIN SERPL BCP-MCNC: 4.1 G/DL (ref 3.4–5)
ANION GAP SERPL CALC-SCNC: 14 MMOL/L (ref 10–20)
BASOPHILS # BLD AUTO: 0 X10*3/UL (ref 0–0.1)
BASOPHILS NFR BLD AUTO: 0 %
BNP SERPL-MCNC: 47 PG/ML (ref 0–99)
BUN SERPL-MCNC: 13 MG/DL (ref 6–23)
CALCIUM SERPL-MCNC: 9.3 MG/DL (ref 8.6–10.6)
CHLORIDE SERPL-SCNC: 106 MMOL/L (ref 98–107)
CHOLEST SERPL-MCNC: 163 MG/DL (ref 0–199)
CHOLESTEROL/HDL RATIO: 4.7
CO2 SERPL-SCNC: 24 MMOL/L (ref 21–32)
CREAT SERPL-MCNC: 1.32 MG/DL (ref 0.5–1.3)
EGFRCR SERPLBLD CKD-EPI 2021: 56 ML/MIN/1.73M*2
EOSINOPHIL # BLD AUTO: 0.1 X10*3/UL (ref 0–0.4)
EOSINOPHIL NFR BLD AUTO: 3 %
ERYTHROCYTE [DISTWIDTH] IN BLOOD BY AUTOMATED COUNT: 15.1 % (ref 11.5–14.5)
FERRITIN SERPL-MCNC: 235 NG/ML (ref 20–300)
GLUCOSE SERPL-MCNC: 250 MG/DL (ref 74–99)
HCT VFR BLD AUTO: 51.2 % (ref 41–52)
HDLC SERPL-MCNC: 34.6 MG/DL
HGB BLD-MCNC: 16.1 G/DL (ref 13.5–17.5)
IMM GRANULOCYTES # BLD AUTO: 0.02 X10*3/UL (ref 0–0.5)
IMM GRANULOCYTES NFR BLD AUTO: 0.6 % (ref 0–0.9)
IRON SATN MFR SERPL: 22 % (ref 25–45)
IRON SERPL-MCNC: 69 UG/DL (ref 35–150)
LDLC SERPL CALC-MCNC: 98 MG/DL
LYMPHOCYTES # BLD AUTO: 1.66 X10*3/UL (ref 0.8–3)
LYMPHOCYTES NFR BLD AUTO: 49.3 %
MCH RBC QN AUTO: 26.6 PG (ref 26–34)
MCHC RBC AUTO-ENTMCNC: 31.4 G/DL (ref 32–36)
MCV RBC AUTO: 85 FL (ref 80–100)
MONOCYTES # BLD AUTO: 0.37 X10*3/UL (ref 0.05–0.8)
MONOCYTES NFR BLD AUTO: 11 %
NEUTROPHILS # BLD AUTO: 1.22 X10*3/UL (ref 1.6–5.5)
NEUTROPHILS NFR BLD AUTO: 36.1 %
NON HDL CHOLESTEROL: 128 MG/DL (ref 0–149)
NRBC BLD-RTO: 0 /100 WBCS (ref 0–0)
PHOSPHATE SERPL-MCNC: 2.6 MG/DL (ref 2.5–4.9)
PLATELET # BLD AUTO: 200 X10*3/UL (ref 150–450)
POTASSIUM SERPL-SCNC: 4.5 MMOL/L (ref 3.5–5.3)
RBC # BLD AUTO: 6.06 X10*6/UL (ref 4.5–5.9)
SODIUM SERPL-SCNC: 139 MMOL/L (ref 136–145)
TIBC SERPL-MCNC: 319 UG/DL (ref 240–445)
TRIGL SERPL-MCNC: 151 MG/DL (ref 0–149)
UIBC SERPL-MCNC: 250 UG/DL (ref 110–370)
VLDL: 30 MG/DL (ref 0–40)
WBC # BLD AUTO: 3.4 X10*3/UL (ref 4.4–11.3)

## 2024-09-03 PROCEDURE — 83550 IRON BINDING TEST: CPT

## 2024-09-03 PROCEDURE — 80069 RENAL FUNCTION PANEL: CPT

## 2024-09-03 PROCEDURE — 83880 ASSAY OF NATRIURETIC PEPTIDE: CPT

## 2024-09-03 PROCEDURE — 83540 ASSAY OF IRON: CPT

## 2024-09-03 PROCEDURE — RXMED WILLOW AMBULATORY MEDICATION CHARGE

## 2024-09-03 PROCEDURE — 85025 COMPLETE CBC W/AUTO DIFF WBC: CPT

## 2024-09-03 PROCEDURE — 80061 LIPID PANEL: CPT

## 2024-09-03 PROCEDURE — 82728 ASSAY OF FERRITIN: CPT

## 2024-09-04 ENCOUNTER — PHARMACY VISIT (OUTPATIENT)
Dept: PHARMACY | Facility: CLINIC | Age: 74
End: 2024-09-04
Payer: MEDICARE

## 2024-09-05 ENCOUNTER — APPOINTMENT (OUTPATIENT)
Dept: RHEUMATOLOGY | Facility: CLINIC | Age: 74
End: 2024-09-05
Payer: MEDICARE

## 2024-09-11 PROCEDURE — RXMED WILLOW AMBULATORY MEDICATION CHARGE

## 2024-09-12 ENCOUNTER — PHARMACY VISIT (OUTPATIENT)
Dept: PHARMACY | Facility: CLINIC | Age: 74
End: 2024-09-12
Payer: MEDICARE

## 2024-09-12 DIAGNOSIS — R39.15 URINARY URGENCY: ICD-10-CM

## 2024-09-12 DIAGNOSIS — R32 URINARY INCONTINENCE, UNSPECIFIED TYPE: ICD-10-CM

## 2024-09-12 PROCEDURE — RXMED WILLOW AMBULATORY MEDICATION CHARGE

## 2024-09-17 RX ORDER — MIRABEGRON 50 MG/1
50 TABLET, FILM COATED, EXTENDED RELEASE ORAL DAILY
Qty: 30 TABLET | Refills: 1 | Status: SHIPPED | OUTPATIENT
Start: 2024-09-17

## 2024-09-19 ENCOUNTER — APPOINTMENT (OUTPATIENT)
Dept: PRIMARY CARE | Facility: CLINIC | Age: 74
End: 2024-09-19
Payer: MEDICARE

## 2024-09-19 ENCOUNTER — CLINICAL SUPPORT (OUTPATIENT)
Dept: CARDIAC REHAB | Facility: HOSPITAL | Age: 74
End: 2024-09-19
Payer: MEDICARE

## 2024-09-19 VITALS
TEMPERATURE: 97.3 F | HEART RATE: 68 BPM | BODY MASS INDEX: 34.7 KG/M2 | OXYGEN SATURATION: 93 % | DIASTOLIC BLOOD PRESSURE: 67 MMHG | SYSTOLIC BLOOD PRESSURE: 108 MMHG | WEIGHT: 215 LBS

## 2024-09-19 VITALS
SYSTOLIC BLOOD PRESSURE: 126 MMHG | HEIGHT: 67 IN | WEIGHT: 208.62 LBS | DIASTOLIC BLOOD PRESSURE: 64 MMHG | BODY MASS INDEX: 32.74 KG/M2 | OXYGEN SATURATION: 99 % | RESPIRATION RATE: 17 BRPM | HEART RATE: 85 BPM

## 2024-09-19 DIAGNOSIS — R31.21 ASYMPTOMATIC MICROSCOPIC HEMATURIA: ICD-10-CM

## 2024-09-19 DIAGNOSIS — Z79.4 TYPE 2 DIABETES MELLITUS WITHOUT COMPLICATION, WITH LONG-TERM CURRENT USE OF INSULIN (MULTI): ICD-10-CM

## 2024-09-19 DIAGNOSIS — E11.51 TYPE 2 DIABETES MELLITUS WITH DIABETIC PERIPHERAL ANGIOPATHY WITHOUT GANGRENE, WITH LONG-TERM CURRENT USE OF INSULIN (MULTI): ICD-10-CM

## 2024-09-19 DIAGNOSIS — I48.91 ATRIAL FIBRILLATION, UNSPECIFIED TYPE (MULTI): ICD-10-CM

## 2024-09-19 DIAGNOSIS — E11.9 TYPE 2 DIABETES MELLITUS WITHOUT COMPLICATION, WITH LONG-TERM CURRENT USE OF INSULIN (MULTI): ICD-10-CM

## 2024-09-19 DIAGNOSIS — I50.22 HEART FAILURE, SYSTOLIC, CHRONIC: ICD-10-CM

## 2024-09-19 DIAGNOSIS — Z79.4 TYPE 2 DIABETES MELLITUS WITH DIABETIC PERIPHERAL ANGIOPATHY WITHOUT GANGRENE, WITH LONG-TERM CURRENT USE OF INSULIN (MULTI): ICD-10-CM

## 2024-09-19 DIAGNOSIS — Z23 FLU VACCINE NEED: Primary | ICD-10-CM

## 2024-09-19 LAB
APPEARANCE UR: CLEAR
BILIRUB UR QL STRIP: NEGATIVE
COLOR UR: YELLOW
GLUCOSE UR STRIP-MCNC: ABNORMAL MG/DL
HGB UR QL STRIP: ABNORMAL
KETONES UR STRIP-MCNC: NEGATIVE MG/DL
LEUKOCYTE ESTERASE UR QL STRIP: NEGATIVE
NITRITE UR QL STRIP: NEGATIVE
PH UR STRIP: 5.5 [PH]
PROT UR STRIP-MCNC: ABNORMAL MG/DL
SP GR UR STRIP.AUTO: 1.02
UROBILINOGEN UR STRIP-ACNC: 0.2 E.U./DL

## 2024-09-19 PROCEDURE — 3078F DIAST BP <80 MM HG: CPT | Performed by: INTERNAL MEDICINE

## 2024-09-19 PROCEDURE — 94621 CARDIOPULM EXERCISE TESTING: CPT

## 2024-09-19 PROCEDURE — 1159F MED LIST DOCD IN RCRD: CPT | Performed by: INTERNAL MEDICINE

## 2024-09-19 PROCEDURE — 90662 IIV NO PRSV INCREASED AG IM: CPT | Performed by: INTERNAL MEDICINE

## 2024-09-19 PROCEDURE — 4274F FLU IMMUNO ADMIND RCVD: CPT | Performed by: INTERNAL MEDICINE

## 2024-09-19 PROCEDURE — 99214 OFFICE O/P EST MOD 30 MIN: CPT | Performed by: INTERNAL MEDICINE

## 2024-09-19 PROCEDURE — 3074F SYST BP LT 130 MM HG: CPT | Performed by: INTERNAL MEDICINE

## 2024-09-19 PROCEDURE — 1160F RVW MEDS BY RX/DR IN RCRD: CPT | Performed by: INTERNAL MEDICINE

## 2024-09-19 PROCEDURE — 3048F LDL-C <100 MG/DL: CPT | Performed by: INTERNAL MEDICINE

## 2024-09-19 PROCEDURE — G0008 ADMIN INFLUENZA VIRUS VAC: HCPCS | Performed by: INTERNAL MEDICINE

## 2024-09-19 PROCEDURE — 1036F TOBACCO NON-USER: CPT | Performed by: INTERNAL MEDICINE

## 2024-09-19 PROCEDURE — 81003 URINALYSIS AUTO W/O SCOPE: CPT | Performed by: INTERNAL MEDICINE

## 2024-09-19 RX ORDER — INSULIN GLARGINE 100 [IU]/ML
INJECTION, SOLUTION SUBCUTANEOUS
Qty: 15 ML | Refills: 2 | Status: SHIPPED | OUTPATIENT
Start: 2024-09-19

## 2024-09-19 RX ORDER — INSULIN GLARGINE 100 [IU]/ML
INJECTION, SOLUTION SUBCUTANEOUS
Qty: 15 ML | Refills: 2 | Status: SHIPPED | OUTPATIENT
Start: 2024-09-19 | End: 2024-09-19 | Stop reason: SDUPTHER

## 2024-09-19 ASSESSMENT — ENCOUNTER SYMPTOMS
DEPRESSION: 1
LOSS OF SENSATION IN FEET: 0
OCCASIONAL FEELINGS OF UNSTEADINESS: 0

## 2024-09-19 ASSESSMENT — PATIENT HEALTH QUESTIONNAIRE - PHQ9
2. FEELING DOWN, DEPRESSED OR HOPELESS: NOT AT ALL
SUM OF ALL RESPONSES TO PHQ9 QUESTIONS 1 AND 2: 0
1. LITTLE INTEREST OR PLEASURE IN DOING THINGS: NOT AT ALL

## 2024-09-19 NOTE — PROGRESS NOTES
Subjective   Patient ID: Thompson Carranza is a 74 y.o. male who presents for Follow-up.    HPI   74 years old male with multiple medical problems including chronic systolic heart failure, heart failure with reduced ejection fraction, nonischemic cardiomyopathy, CAD, PAD, diabetes mellitus type 2, HIV, sleep apnea, PCI to LAD in 2021, dual-chamber defibrillator and atrial flutter with ablation in May 2023.  Comes in today because he is feeling well and he wants to checkup on his medical condition and the needs refill on Basaglar taking 18 units in the morning and 26 units in the evening.  His blood sugar today was 150.  He had a stress test today and he scored excellent, echo tomorrow.  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  Lives alone and he is doing well.  Medication reviewed and reconciled he is taking Eliquis 5 mg twice a day, Plavix 75 mg a day, Tivicay 50 mg a day, Jardiance 10 mg a day, Descovy 200/25 mg a day, Inspra or eplerenone 25 mg once a day, Zetia 10 mg a day, hydralazine 10 mg 3 times a day, Basaglar 18 units in the morning 26 units in the evening, isosorbide 10 mg 3 times a day, Toprol 200 mg once a day, Myrbetriq 50 mg a day, Entresto 97/103 mg twice a day.  Review of Systems  12 system review 12 system are negative  Objective   /67 (BP Location: Right arm, Patient Position: Sitting, BP Cuff Size: Large adult)   Pulse 68   Temp 36.3 °C (97.3 °F) (Temporal)   Wt 97.5 kg (215 lb)   SpO2 93%   BMI 33.67 kg/m²     Physical Exam  Alert oriented no distress nonicteric sclera no jaundice.  Face symmetrical cranial nerves intact.  Neck supple no masses no lymph node or thyromegaly no jugular venous distention.  Lungs clear no rales wheezing or crackles.  Heart normal S1 and S2 regular rhythm.  Abdomen benign neurologically intact.  Needs to lose weight and he is aware of that.  He is working on it and he made me revealed that he would be below 200 pounds by his next visit.  Today  his weight was 215 pounds  Assessment/Plan   Diagnoses and all orders for this visit:  Flu vaccine need  -     Flu vaccine, trivalent, preservative free, HIGH-DOSE, age 65y+ (Fluzone)  Asymptomatic microscopic hematuria  -     POCT UA (Automated) docked device  Heart failure, systolic, chronic (Multi)  Atrial fibrillation, unspecified type (Multi)  Type 2 diabetes mellitus with diabetic peripheral angiopathy without gangrene, with long-term current use of insulin (Multi)  Other orders  -     POCT URINALYSIS AUTOMATED

## 2024-09-19 NOTE — PROGRESS NOTES
Cardiopulmonary (Metabolic) Stress Test    PATIENT: Thompson Carranza  DATE: 2024  AGE/: 74 y.o./1950  REFERRING PHYSICIAN: Alma Mendez MD*    Cardiopulmonary Stress Test was completed today in Cardiopulmonary Stress Lab at [facility]. Correct procedure and correct patient verified verbally and with ID Band checked.    Vitals:    24 1030   BP: 126/64   Pulse: 85   Resp: 17   SpO2: 99%     Vitals:    24 1030   Weight: 94.6 kg (208 lb 9.9 oz)   Body mass index is 32.67 kg/m².    Please see complete testing results for order in [facility] reporting with final physician interpretation.  Patient has met discharge criteria and has been discharged to home.    Christine Romeo MS, ACSM-CEP, AACVPR, CCRP

## 2024-09-20 ENCOUNTER — HOSPITAL ENCOUNTER (OUTPATIENT)
Dept: CARDIOLOGY | Facility: HOSPITAL | Age: 74
Discharge: HOME | End: 2024-09-20
Payer: MEDICARE

## 2024-09-20 ENCOUNTER — HOSPITAL ENCOUNTER (OUTPATIENT)
Dept: VASCULAR MEDICINE | Facility: HOSPITAL | Age: 74
Discharge: HOME | End: 2024-09-20
Payer: MEDICARE

## 2024-09-20 DIAGNOSIS — I73.9 CLAUDICATION (CMS-HCC): ICD-10-CM

## 2024-09-20 DIAGNOSIS — I50.22 HEART FAILURE, SYSTOLIC, CHRONIC: ICD-10-CM

## 2024-09-20 LAB
AORTIC VALVE PEAK VELOCITY: 1.16 M/S
AV PEAK GRADIENT: 5.4 MMHG
AVA (PEAK VEL): 3.17 CM2
EJECTION FRACTION APICAL 4 CHAMBER: 55.6
EJECTION FRACTION: 48 %
LEFT ATRIUM VOLUME AREA LENGTH INDEX BSA: 27.1 ML/M2
LEFT VENTRICLE INTERNAL DIMENSION DIASTOLE: 4.76 CM (ref 3.5–6)
LEFT VENTRICULAR OUTFLOW TRACT DIAMETER: 2.33 CM
MITRAL VALVE E/A RATIO: 0.59
RIGHT VENTRICLE FREE WALL PEAK S': 9 CM/S

## 2024-09-20 PROCEDURE — 93306 TTE W/DOPPLER COMPLETE: CPT | Performed by: INTERNAL MEDICINE

## 2024-09-20 PROCEDURE — 93306 TTE W/DOPPLER COMPLETE: CPT

## 2024-09-20 PROCEDURE — 93924 LWR XTR VASC STDY BILAT: CPT

## 2024-09-20 PROCEDURE — 2500000004 HC RX 250 GENERAL PHARMACY W/ HCPCS (ALT 636 FOR OP/ED): Performed by: STUDENT IN AN ORGANIZED HEALTH CARE EDUCATION/TRAINING PROGRAM

## 2024-09-20 PROCEDURE — 93924 LWR XTR VASC STDY BILAT: CPT | Performed by: INTERNAL MEDICINE

## 2024-09-22 PROCEDURE — RXMED WILLOW AMBULATORY MEDICATION CHARGE

## 2024-09-23 PROCEDURE — RXMED WILLOW AMBULATORY MEDICATION CHARGE

## 2024-09-24 ENCOUNTER — PHARMACY VISIT (OUTPATIENT)
Dept: PHARMACY | Facility: CLINIC | Age: 74
End: 2024-09-24
Payer: MEDICARE

## 2024-09-24 PROCEDURE — RXMED WILLOW AMBULATORY MEDICATION CHARGE

## 2024-09-24 RX ORDER — PEN NEEDLE, DIABETIC 30 GX3/16"
NEEDLE, DISPOSABLE MISCELLANEOUS
Qty: 200 EACH | Refills: 3 | Status: CANCELLED | OUTPATIENT
Start: 2024-09-24 | End: 2025-09-24

## 2024-09-27 DIAGNOSIS — G47.31 COMPLEX SLEEP APNEA SYNDROME: Primary | ICD-10-CM

## 2024-09-27 NOTE — PROGRESS NOTES
Echo indicates EF of 48%  Discussed case with Dr. Allen, reading physician of sleep study and agrees ASV titration appropriate.   Also did discuss Remede with patient, but he denies interest any surgical procedure at this time    Will plan to order pap device after study  I have provided avinash number and requested he schedule to see me ~3-4 months out

## 2024-10-05 PROCEDURE — RXMED WILLOW AMBULATORY MEDICATION CHARGE

## 2024-10-07 DIAGNOSIS — E11.51 TYPE 2 DIABETES MELLITUS WITH DIABETIC PERIPHERAL ANGIOPATHY WITHOUT GANGRENE, WITH LONG-TERM CURRENT USE OF INSULIN (MULTI): ICD-10-CM

## 2024-10-07 DIAGNOSIS — Z79.4 TYPE 2 DIABETES MELLITUS WITH DIABETIC PERIPHERAL ANGIOPATHY WITHOUT GANGRENE, WITH LONG-TERM CURRENT USE OF INSULIN (MULTI): ICD-10-CM

## 2024-10-08 ENCOUNTER — PHARMACY VISIT (OUTPATIENT)
Dept: PHARMACY | Facility: CLINIC | Age: 74
End: 2024-10-08
Payer: MEDICARE

## 2024-10-08 PROCEDURE — RXMED WILLOW AMBULATORY MEDICATION CHARGE

## 2024-10-09 DIAGNOSIS — Z79.4 TYPE 2 DIABETES MELLITUS WITHOUT COMPLICATION, WITH LONG-TERM CURRENT USE OF INSULIN (MULTI): Primary | ICD-10-CM

## 2024-10-09 DIAGNOSIS — E11.9 TYPE 2 DIABETES MELLITUS WITHOUT COMPLICATION, WITH LONG-TERM CURRENT USE OF INSULIN (MULTI): Primary | ICD-10-CM

## 2024-10-09 PROCEDURE — RXMED WILLOW AMBULATORY MEDICATION CHARGE

## 2024-10-09 RX ORDER — PEN NEEDLE, DIABETIC 31 GX5/16"
100 NEEDLE, DISPOSABLE MISCELLANEOUS 2 TIMES DAILY
Qty: 200 EACH | Refills: 3 | Status: SHIPPED | OUTPATIENT
Start: 2024-10-09 | End: 2025-10-09

## 2024-10-09 RX ORDER — PEN NEEDLE, DIABETIC 30 GX3/16"
1 NEEDLE, DISPOSABLE MISCELLANEOUS 2 TIMES DAILY
Qty: 200 EACH | Refills: 1 | Status: SHIPPED | OUTPATIENT
Start: 2024-10-09

## 2024-10-10 ENCOUNTER — PHARMACY VISIT (OUTPATIENT)
Dept: PHARMACY | Facility: CLINIC | Age: 74
End: 2024-10-10
Payer: MEDICARE

## 2024-10-19 DIAGNOSIS — I50.41 ACUTE COMBINED SYSTOLIC AND DIASTOLIC CONGESTIVE HEART FAILURE: Primary | ICD-10-CM

## 2024-10-19 DIAGNOSIS — I50.22 HEART FAILURE, SYSTOLIC, CHRONIC: ICD-10-CM

## 2024-10-19 DIAGNOSIS — E11.3293 TYPE 2 DIABETES MELLITUS WITH MILD NONPROLIFERATIVE RETINOPATHY OF BOTH EYES WITHOUT MACULAR EDEMA, UNSPECIFIED WHETHER LONG TERM INSULIN USE: ICD-10-CM

## 2024-10-19 DIAGNOSIS — E11.69 TYPE 2 DIABETES MELLITUS WITH OTHER SPECIFIED COMPLICATION, UNSPECIFIED WHETHER LONG TERM INSULIN USE (MULTI): ICD-10-CM

## 2024-10-21 PROCEDURE — RXMED WILLOW AMBULATORY MEDICATION CHARGE

## 2024-10-24 ENCOUNTER — PHARMACY VISIT (OUTPATIENT)
Dept: PHARMACY | Facility: CLINIC | Age: 74
End: 2024-10-24
Payer: MEDICARE

## 2024-10-24 PROCEDURE — RXMED WILLOW AMBULATORY MEDICATION CHARGE

## 2024-10-29 PROCEDURE — RXMED WILLOW AMBULATORY MEDICATION CHARGE

## 2024-10-31 ENCOUNTER — OFFICE VISIT (OUTPATIENT)
Dept: CARDIOLOGY | Facility: CLINIC | Age: 74
End: 2024-10-31
Payer: MEDICARE

## 2024-10-31 VITALS
BODY MASS INDEX: 33.9 KG/M2 | HEIGHT: 67 IN | DIASTOLIC BLOOD PRESSURE: 74 MMHG | SYSTOLIC BLOOD PRESSURE: 128 MMHG | WEIGHT: 216 LBS | OXYGEN SATURATION: 98 % | HEART RATE: 74 BPM

## 2024-10-31 DIAGNOSIS — E11.69 TYPE 2 DIABETES MELLITUS WITH OTHER SPECIFIED COMPLICATION, UNSPECIFIED WHETHER LONG TERM INSULIN USE (MULTI): ICD-10-CM

## 2024-10-31 DIAGNOSIS — I50.41 ACUTE COMBINED SYSTOLIC AND DIASTOLIC CONGESTIVE HEART FAILURE: ICD-10-CM

## 2024-10-31 DIAGNOSIS — I50.22 HEART FAILURE, SYSTOLIC, CHRONIC: ICD-10-CM

## 2024-10-31 PROCEDURE — 3074F SYST BP LT 130 MM HG: CPT | Performed by: STUDENT IN AN ORGANIZED HEALTH CARE EDUCATION/TRAINING PROGRAM

## 2024-10-31 PROCEDURE — 99215 OFFICE O/P EST HI 40 MIN: CPT | Performed by: STUDENT IN AN ORGANIZED HEALTH CARE EDUCATION/TRAINING PROGRAM

## 2024-10-31 PROCEDURE — 4274F FLU IMMUNO ADMIND RCVD: CPT | Performed by: STUDENT IN AN ORGANIZED HEALTH CARE EDUCATION/TRAINING PROGRAM

## 2024-10-31 PROCEDURE — 1036F TOBACCO NON-USER: CPT | Performed by: STUDENT IN AN ORGANIZED HEALTH CARE EDUCATION/TRAINING PROGRAM

## 2024-10-31 PROCEDURE — 3078F DIAST BP <80 MM HG: CPT | Performed by: STUDENT IN AN ORGANIZED HEALTH CARE EDUCATION/TRAINING PROGRAM

## 2024-10-31 PROCEDURE — 3048F LDL-C <100 MG/DL: CPT | Performed by: STUDENT IN AN ORGANIZED HEALTH CARE EDUCATION/TRAINING PROGRAM

## 2024-10-31 PROCEDURE — 1159F MED LIST DOCD IN RCRD: CPT | Performed by: STUDENT IN AN ORGANIZED HEALTH CARE EDUCATION/TRAINING PROGRAM

## 2024-10-31 ASSESSMENT — ENCOUNTER SYMPTOMS
COLOR CHANGE: 0
FEVER: 0
OCCASIONAL FEELINGS OF UNSTEADINESS: 0
COUGH: 0
WEIGHT GAIN: 0
DIFFICULTY WITH CONCENTRATION: 1
WEIGHT LOSS: 0
WHEEZING: 0
DECREASED APPETITE: 0
PALPITATIONS: 0
CLAUDICATION: 0
LOSS OF BALANCE: 0
NEAR-SYNCOPE: 0
HALLUCINATIONS: 0
ABDOMINAL PAIN: 0
FOCAL WEAKNESS: 0
PND: 0
SHORTNESS OF BREATH: 0
IRREGULAR HEARTBEAT: 0
ORTHOPNEA: 0
SPUTUM PRODUCTION: 0
SYNCOPE: 0
ALTERED MENTAL STATUS: 0
HEMATURIA: 0
HEMATEMESIS: 0
WEAKNESS: 0
DYSPNEA ON EXERTION: 0
HEMATOCHEZIA: 0

## 2024-11-01 ENCOUNTER — PHARMACY VISIT (OUTPATIENT)
Dept: PHARMACY | Facility: CLINIC | Age: 74
End: 2024-11-01
Payer: MEDICARE

## 2024-11-04 ENCOUNTER — LAB (OUTPATIENT)
Dept: LAB | Facility: LAB | Age: 74
End: 2024-11-04
Payer: MEDICARE

## 2024-11-04 DIAGNOSIS — I50.22 HEART FAILURE, SYSTOLIC, CHRONIC: ICD-10-CM

## 2024-11-04 LAB
ALBUMIN SERPL BCP-MCNC: 4.1 G/DL (ref 3.4–5)
ANION GAP SERPL CALC-SCNC: 12 MMOL/L (ref 10–20)
BNP SERPL-MCNC: 16 PG/ML (ref 0–99)
BUN SERPL-MCNC: 16 MG/DL (ref 6–23)
CALCIUM SERPL-MCNC: 9.5 MG/DL (ref 8.6–10.6)
CHLORIDE SERPL-SCNC: 103 MMOL/L (ref 98–107)
CO2 SERPL-SCNC: 27 MMOL/L (ref 21–32)
CREAT SERPL-MCNC: 1.24 MG/DL (ref 0.5–1.3)
EGFRCR SERPLBLD CKD-EPI 2021: 61 ML/MIN/1.73M*2
FERRITIN SERPL-MCNC: 213 NG/ML (ref 20–300)
FOLATE SERPL-MCNC: 14.2 NG/ML
GLUCOSE SERPL-MCNC: 107 MG/DL (ref 74–99)
IRON SATN MFR SERPL: 21 % (ref 25–45)
IRON SERPL-MCNC: 70 UG/DL (ref 35–150)
PHOSPHATE SERPL-MCNC: 2.9 MG/DL (ref 2.5–4.9)
POTASSIUM SERPL-SCNC: 4.4 MMOL/L (ref 3.5–5.3)
SODIUM SERPL-SCNC: 138 MMOL/L (ref 136–145)
TIBC SERPL-MCNC: 327 UG/DL (ref 240–445)
UIBC SERPL-MCNC: 257 UG/DL (ref 110–370)
VIT B12 SERPL-MCNC: >2000 PG/ML (ref 211–911)

## 2024-11-04 PROCEDURE — 82728 ASSAY OF FERRITIN: CPT

## 2024-11-04 PROCEDURE — 80069 RENAL FUNCTION PANEL: CPT

## 2024-11-04 PROCEDURE — 82607 VITAMIN B-12: CPT

## 2024-11-04 PROCEDURE — RXMED WILLOW AMBULATORY MEDICATION CHARGE

## 2024-11-04 PROCEDURE — 82746 ASSAY OF FOLIC ACID SERUM: CPT

## 2024-11-04 PROCEDURE — 83540 ASSAY OF IRON: CPT

## 2024-11-04 PROCEDURE — 83550 IRON BINDING TEST: CPT

## 2024-11-04 PROCEDURE — 83880 ASSAY OF NATRIURETIC PEPTIDE: CPT

## 2024-11-04 PROCEDURE — 36415 COLL VENOUS BLD VENIPUNCTURE: CPT

## 2024-11-07 ENCOUNTER — PHARMACY VISIT (OUTPATIENT)
Dept: PHARMACY | Facility: CLINIC | Age: 74
End: 2024-11-07
Payer: MEDICARE

## 2024-11-07 DIAGNOSIS — I50.41 ACUTE COMBINED SYSTOLIC AND DIASTOLIC CONGESTIVE HEART FAILURE: ICD-10-CM

## 2024-11-07 DIAGNOSIS — I50.22 HEART FAILURE, SYSTOLIC, CHRONIC: ICD-10-CM

## 2024-11-07 DIAGNOSIS — E11.69 TYPE 2 DIABETES MELLITUS WITH OTHER SPECIFIED COMPLICATION, UNSPECIFIED WHETHER LONG TERM INSULIN USE (MULTI): ICD-10-CM

## 2024-11-11 ENCOUNTER — PHARMACY VISIT (OUTPATIENT)
Dept: PHARMACY | Facility: CLINIC | Age: 74
End: 2024-11-11
Payer: MEDICARE

## 2024-11-11 PROCEDURE — RXMED WILLOW AMBULATORY MEDICATION CHARGE

## 2024-11-13 PROCEDURE — RXMED WILLOW AMBULATORY MEDICATION CHARGE

## 2024-11-15 ENCOUNTER — PHARMACY VISIT (OUTPATIENT)
Dept: PHARMACY | Facility: CLINIC | Age: 74
End: 2024-11-15
Payer: MEDICARE

## 2024-11-15 PROCEDURE — RXMED WILLOW AMBULATORY MEDICATION CHARGE

## 2024-11-20 ENCOUNTER — NUTRITION (OUTPATIENT)
Dept: IMMUNOLOGY | Facility: CLINIC | Age: 74
End: 2024-11-20
Payer: MEDICARE

## 2024-11-20 ENCOUNTER — OFFICE VISIT (OUTPATIENT)
Dept: IMMUNOLOGY | Facility: CLINIC | Age: 74
End: 2024-11-20
Payer: MEDICARE

## 2024-11-20 VITALS
OXYGEN SATURATION: 96 % | DIASTOLIC BLOOD PRESSURE: 82 MMHG | BODY MASS INDEX: 34.46 KG/M2 | RESPIRATION RATE: 20 BRPM | WEIGHT: 220 LBS | HEART RATE: 60 BPM | SYSTOLIC BLOOD PRESSURE: 133 MMHG

## 2024-11-20 DIAGNOSIS — B20 HUMAN IMMUNODEFICIENCY VIRUS (MULTI): ICD-10-CM

## 2024-11-20 DIAGNOSIS — Z21 HIV INFECTION, UNSPECIFIED SYMPTOM STATUS: ICD-10-CM

## 2024-11-20 LAB
BASOPHILS # BLD MANUAL: 0.03 X10*3/UL (ref 0–0.1)
BASOPHILS NFR BLD MANUAL: 0.9 %
EOSINOPHIL # BLD MANUAL: 0.09 X10*3/UL (ref 0–0.4)
EOSINOPHIL NFR BLD MANUAL: 2.6 %
ERYTHROCYTE [DISTWIDTH] IN BLOOD BY AUTOMATED COUNT: 14.2 % (ref 11.5–14.5)
HCT VFR BLD AUTO: 51.9 % (ref 41–52)
HGB BLD-MCNC: 16.6 G/DL (ref 13.5–17.5)
IMM GRANULOCYTES # BLD AUTO: 0.01 X10*3/UL (ref 0–0.5)
IMM GRANULOCYTES NFR BLD AUTO: 0.3 % (ref 0–0.9)
LYMPHOCYTES # BLD MANUAL: 2.35 X10*3/UL (ref 0.8–3)
LYMPHOCYTES NFR BLD MANUAL: 69 %
MCH RBC QN AUTO: 27.2 PG (ref 26–34)
MCHC RBC AUTO-ENTMCNC: 32 G/DL (ref 32–36)
MCV RBC AUTO: 85 FL (ref 80–100)
MONOCYTES # BLD MANUAL: 0.35 X10*3/UL (ref 0.05–0.8)
MONOCYTES NFR BLD MANUAL: 10.3 %
NEUTS SEG # BLD MANUAL: 0.58 X10*3/UL (ref 1.6–5)
NEUTS SEG NFR BLD MANUAL: 17.2 %
NRBC BLD-RTO: 0 /100 WBCS (ref 0–0)
PLATELET # BLD AUTO: 259 X10*3/UL (ref 150–450)
RBC # BLD AUTO: 6.1 X10*6/UL (ref 4.5–5.9)
RBC MORPH BLD: ABNORMAL
TOTAL CELLS COUNTED BLD: 116
WBC # BLD AUTO: 3.4 X10*3/UL (ref 4.4–11.3)

## 2024-11-20 PROCEDURE — 85027 COMPLETE CBC AUTOMATED: CPT | Performed by: INTERNAL MEDICINE

## 2024-11-20 PROCEDURE — 87536 HIV-1 QUANT&REVRSE TRNSCRPJ: CPT | Performed by: INTERNAL MEDICINE

## 2024-11-20 PROCEDURE — 88185 FLOWCYTOMETRY/TC ADD-ON: CPT | Performed by: INTERNAL MEDICINE

## 2024-11-20 PROCEDURE — 85007 BL SMEAR W/DIFF WBC COUNT: CPT | Performed by: INTERNAL MEDICINE

## 2024-11-20 PROCEDURE — 86318 IA INFECTIOUS AGENT ANTIBODY: CPT | Performed by: INTERNAL MEDICINE

## 2024-11-20 PROCEDURE — 36415 COLL VENOUS BLD VENIPUNCTURE: CPT | Performed by: INTERNAL MEDICINE

## 2024-11-20 PROCEDURE — 99417 PROLNG OP E/M EACH 15 MIN: CPT | Performed by: INTERNAL MEDICINE

## 2024-11-20 PROCEDURE — 99214 OFFICE O/P EST MOD 30 MIN: CPT | Performed by: INTERNAL MEDICINE

## 2024-11-20 ASSESSMENT — PAIN SCALES - GENERAL: PAINLEVEL_OUTOF10: 4

## 2024-11-20 ASSESSMENT — ENCOUNTER SYMPTOMS
HEMATOLOGIC/LYMPHATIC NEGATIVE: 1
CONSTITUTIONAL NEGATIVE: 1
PSYCHIATRIC NEGATIVE: 1
RESPIRATORY NEGATIVE: 1
EYES NEGATIVE: 1
ALLERGIC/IMMUNOLOGIC NEGATIVE: 1
CARDIOVASCULAR NEGATIVE: 1
NEUROLOGICAL NEGATIVE: 1
GASTROINTESTINAL NEGATIVE: 1
MUSCULOSKELETAL NEGATIVE: 1

## 2024-11-20 NOTE — LETTER
11/22/24    Yonas Phan MD  [address]  [place] [address]      Dear Dr. Yonas Phan MD,    I am writing to confirm that your patient, Thompson Carranza, received care in my office on 11/22/24. I have enclosed a summary of the care provided to Thompson for your reference.    Please contact me with any questions you may have regarding the visit.    Sincerely,         Viral Quintanilla MD MPH  [address]  [place] [facility] [place] [address] [phone]    CC: No Recipients

## 2024-11-20 NOTE — PROGRESS NOTES
"HIV Clinic Follow-up Visit:    Thompson Carranza was last seen in [facility] on Visit date not found    The patient reports doing well with no major complaints. He remains stable on ART and compliant with his regimen. Last labs from 9/2023 showed a CD4 count of 1023 and an undetectable viral load. He has gained 4 lbs since his last visit and has been going to the gym regularly. He had been using a weight loss supplement with Vitamin B12 every three days, resulting in elevated B12 levels (>2000), and was advised by his cardiologist, Dr. EFREM Mendez, to discontinue it.    Updated labs from 9/2024 include HDL 34.6 mg/dL (low), cholesterol/HDL ratio 4.7 (suboptimal),  mg/dL (borderline high as of 7/2023), VLDL 30 mg/dL (normal), and triglycerides 151 mg/dL (high). Hemoglobin A1C from 7/2023 was 7.5%, indicating suboptimal diabetes control.    We discussed diet and exercise regimens, including intermittent fasting, as potential strategies for weight loss. He otherwise reports no concerns.       Review of Systems  Review of Systems   Constitutional: Negative.    HENT: Negative.     Eyes: Negative.    Respiratory: Negative.     Cardiovascular: Negative.    Gastrointestinal: Negative.    Genitourinary: Negative.    Musculoskeletal: Negative.    Skin: Negative.    Allergic/Immunologic: Negative.    Neurological: Negative.    Hematological: Negative.    Psychiatric/Behavioral: Negative.     All other systems reviewed and are negative.      CURRENT MEDICATIONS:    Current Outpatient Medications:     apixaban (Eliquis) 5 mg tablet, TAKE 1 TABLET BY MOUTH TWO TIMES A DAY, Disp: 180 tablet, Rfl: 3    Basaglar KwikPen U-100 Insulin 100 unit/mL (3 mL) pen, INJECT 18 UNITS UNDER THE SKIN IN THE MORNING AND INJECT 26 UNITS IN THE EVENING., Disp: 15 mL, Rfl: 2    BD Ultra-Fine Short Pen Needle 31 gauge x 5/16\" needle, 100 each by abdominal subcutaneous route 2 times a day., Disp: 200 each, Rfl: 3    blood sugar diagnostic (OneTouch " "Ultra Test) strip, TEST BLOOD GLUCOSE TWO TIMES A DAY, Disp: 200 each, Rfl: 2    clopidogrel (Plavix) 75 mg tablet, Take 1 tablet (75 mg) by mouth once daily., Disp: 90 tablet, Rfl: 3    dolutegravir (Tivicay) 50 mg tablet, Take 1 tablet by mouth daily, Disp: 30 tablet, Rfl: 5    empagliflozin (Jardiance) 10 mg, TAKE 1 TABLET BY MOUTH DAILY, Disp: 90 tablet, Rfl: 3    emtricitabine-tenofovir alafen (Descovy) 200-25 mg tablet, TAKE 1 TABLET BY MOUTH DAILY, Disp: 30 tablet, Rfl: 5    Entresto  mg tablet, Take 1 tablet by mouth 2 times a day., Disp: 180 tablet, Rfl: 3    eplerenone (Inspra) 25 mg tablet, TAKE 1 TABLET BY MOUTH ONCE DAILY, Disp: 90 tablet, Rfl: 3    ezetimibe (Zetia) 10 mg tablet, TAKE 1 TABLET BY MOUTH ONCE DAILY AT BEDTIME, Disp: 90 tablet, Rfl: 3    flash glucose scanning reader (FreeStyle Darryl 2 Gardner) misc, Use as instructed, Disp: 1 each, Rfl: 0    flash glucose sensor (FREESTYLE DARRYL 2 SENSOR [facility]), every 14 (fourteen) days. Change sensor, Disp: , Rfl:     flash glucose sensor kit (FreeStyle Darryl 2 Sensor) kit, Use as instructed,change every 2 weeks, Disp: 6 each, Rfl: 1    hydrALAZINE (Apresoline) 10 mg tablet, TAKE 1 TABLET BY MOUTH THREE TIMES A DAY, Disp: 270 tablet, Rfl: 3    isosorbide dinitrate (Isordil) 10 mg tablet, TAKE 1 TABLET BY MOUTH THREE TIMES A DAY, Disp: 270 tablet, Rfl: 3    metoprolol succinate XL (Toprol-XL) 200 mg 24 hr tablet, TAKE 1 TABLET BY MOUTH ONCE DAILY, Disp: 90 tablet, Rfl: 3    OneTouch Delica Plus Lancet 33 gauge misc, , Disp: , Rfl:     OneTouch Ultra Test strip, 1 strip 2 times a day. TEST BLOOD GLUCOSE, Disp: , Rfl:     pen needle, diabetic (BD Ultra-Fine Short Pen Needle) 31 gauge x 5/16\" needle, 1 each by abdominal subcutaneous route 2 times a day., Disp: 200 each, Rfl: 1    semaglutide (Ozempic) 0.25 mg or 0.5 mg (2 mg/3 mL) pen injector, Inject 0.5 mg under the skin every 7 days., Disp: 9 mL, Rfl: 1    mirabegron (Myrbetriq) 50 mg tablet extended " release 24 hr 24 hr tablet, Take 1 tablet (50 mg) by mouth once daily. (Patient not taking: Reported on 11/20/2024), Disp: 30 tablet, Rfl: 1    PHYSICAL EXAMINATION:  Visit Vitals  /82 (BP Location: Right arm, Patient Position: Sitting, BP Cuff Size: Large adult)   Pulse 60   Resp 20   Wt 99.8 kg (220 lb)   SpO2 96%   BMI 34.46 kg/m²   Smoking Status Former   BSA 2.17 m²       Physical Exam   Physical Exam  Vitals reviewed.   Constitutional:       Appearance: Normal appearance.   HENT:      Head: Normocephalic.      Nose: Nose normal.   Eyes:      Extraocular Movements: Extraocular movements intact.      Pupils: Pupils are equal, round, and reactive to light.   Cardiovascular:      Rate and Rhythm: Normal rate and regular rhythm.   Pulmonary:      Effort: Pulmonary effort is normal.   Abdominal:      General: Abdomen is flat. Bowel sounds are normal.   Musculoskeletal:         General: Normal range of motion.      Cervical back: Normal range of motion.   Skin:     General: Skin is warm.   Neurological:      General: No focal deficit present.      Mental Status: He is alert.   Psychiatric:         Mood and Affect: Mood normal.         Behavior: Behavior normal.         Thought Content: Thought content normal.         Judgment: Judgment normal.       PERTINENT DATA:  CBC:  WBC   Date Value Ref Range Status   09/03/2024 3.4 (L) 4.4 - 11.3 x10*3/uL Final     nRBC   Date Value Ref Range Status   09/03/2024 0.0 0.0 - 0.0 /100 WBCs Final     RBC   Date Value Ref Range Status   09/03/2024 6.06 (H) 4.50 - 5.90 x10*6/uL Final     Hemoglobin   Date Value Ref Range Status   09/03/2024 16.1 13.5 - 17.5 g/dL Final     MCV   Date Value Ref Range Status   09/03/2024 85 80 - 100 fL Final     RDW   Date Value Ref Range Status   09/03/2024 15.1 (H) 11.5 - 14.5 % Final       Renal Function Panel:  Glucose   Date Value Ref Range Status   11/04/2024 107 (H) 74 - 99 mg/dL Final     Sodium   Date Value Ref Range Status   11/04/2024  138 136 - 145 mmol/L Final     Potassium   Date Value Ref Range Status   11/04/2024 4.4 3.5 - 5.3 mmol/L Final     Chloride   Date Value Ref Range Status   11/04/2024 103 98 - 107 mmol/L Final     Anion Gap   Date Value Ref Range Status   11/04/2024 12 10 - 20 mmol/L Final     Urea Nitrogen   Date Value Ref Range Status   11/04/2024 16 6 - 23 mg/dL Final     Creatinine   Date Value Ref Range Status   11/04/2024 1.24 0.50 - 1.30 mg/dL Final     eGFR   Date Value Ref Range Status   11/04/2024 61 >60 mL/min/1.73m*2 Final     Comment:     Calculations of estimated GFR are performed using the 2021 CKD-EPI Study Refit equation without the race variable for the IDMS-Traceable creatinine methods.  https://jasn.asnjournals.org/content/early/2021/09/22/ASN.8883252975     Calcium   Date Value Ref Range Status   11/04/2024 9.5 8.6 - 10.6 mg/dL Final     Phosphorus   Date Value Ref Range Status   11/04/2024 2.9 2.5 - 4.9 mg/dL Final     Comment:     The performance characteristics of phosphorus testing in heparinized plasma have been validated by the individual  laboratory site where testing is performed. Testing on heparinized plasma is not approved by the FDA; however, such approval is not necessary.     Albumin   Date Value Ref Range Status   11/04/2024 4.1 3.4 - 5.0 g/dL Final       Hepatic Panel:  Albumin   Date Value Ref Range Status   11/04/2024 4.1 3.4 - 5.0 g/dL Final     Bilirubin, Total   Date Value Ref Range Status   03/13/2024 0.4 0.0 - 1.2 mg/dL Final     Bilirubin, Direct   Date Value Ref Range Status   05/12/2023 CANCELED       Comment:     Result canceled by the ancillary.     Alkaline Phosphatase   Date Value Ref Range Status   03/13/2024 68 33 - 136 U/L Final     ALT   Date Value Ref Range Status   03/13/2024 33 10 - 52 U/L Final     Comment:     Patients treated with Sulfasalazine may generate falsely decreased results for ALT.       HIV Viral Load:  HIV-1 RNA PCR Viral Load Log   Date Value Ref Range  Status   03/13/2024 2.05 log10 cpy/mL Final     HIV RNA Result   Date Value Ref Range Status   03/13/2024 Detected (A) Not Detected Final       CD4 Count:  CD3+CD4+%   Date Value Ref Range Status   03/13/2024 39 29 - 57 % Final     CD3+CD4+ Absolute   Date Value Ref Range Status   03/13/2024 1.326 0.350 - 2.740 x10E9/L Final     CD3+CD8+%   Date Value Ref Range Status   03/13/2024 35 (H) 7 - 31 % Final     CD3+CD8+ Absolute   Date Value Ref Range Status   03/13/2024 1.190 0.080 - 1.490 x10E9/L Final     CD4/CD8 Ratio   Date Value Ref Range Status   03/13/2024 1.11 1.00 - 2.60 Final     CD45%   Date Value Ref Range Status   09/13/2023 100 % Final       CRCL:  Creatinine, Urine Random   Date Value Ref Range Status   11/09/2023 68.2 20.0 - 370.0 mg/dL Final       Lipid Panel:  HDL-Cholesterol   Date Value Ref Range Status   09/03/2024 34.6 mg/dL Final     Comment:       Age       Very Low   Low     Normal    High    0-19 Y    < 35      < 40     40-45     ----  20-24 Y    ----     < 40      >45      ----        >24 Y      ----     < 40     40-60      >60       Cholesterol/HDL Ratio   Date Value Ref Range Status   09/03/2024 4.7  Final     Comment:       Ref Values  Desirable  < 3.4  High Risk  > 5.0     LDL   Date Value Ref Range Status   07/24/2023 109 (H) 0 - 99 mg/dL Final     Comment:     .                           NEAR      BORD      AGE      DESIRABLE  OPTIMAL    HIGH     HIGH     VERY HIGH     0-19 Y     0 - 109     ---    110-129   >/= 130     ----    20-24 Y     0 - 119     ---    120-159   >/= 160     ----      >24 Y     0 -  99   100-129  130-159   160-189     >/=190  .       VLDL   Date Value Ref Range Status   09/03/2024 30 0 - 40 mg/dL Final     Triglycerides   Date Value Ref Range Status   09/03/2024 151 (H) 0 - 149 mg/dL Final     Comment:        Age         Desirable   Borderline High   High     Very High   0 D-90 D    19 - 174         ----         ----        ----  91 D- 9 Y     0 -  74        75 -   99     >/= 100      ----    10-19 Y     0 -  89        90 - 129     >/= 130      ----    20-24 Y     0 - 114       115 - 149     >/= 150      ----         >24 Y     0 - 149       150 - 199    200- 499    >/= 500    Venipuncture immediately after or during the administration of Metamizole may lead to falsely low results. Testing should be performed immediately prior to Metamizole dosing.       HgbA1c:  Hemoglobin A1C   Date Value Ref Range Status   07/24/2023 7.5 (A) % Final     Comment:          Diagnosis of Diabetes-Adults   Non-Diabetic: < or = 5.6%   Increased risk for developing diabetes: 5.7-6.4%   Diagnostic of diabetes: > or = 6.5%  .       Monitoring of Diabetes                Age (y)     Therapeutic Goal (%)   Adults:          >18           <7.0   Pediatrics:    13-18           <7.5                   7-12           <8.0                   0- 6            7.5-8.5   American Diabetes Association. Diabetes Care 33(S1), Jan 2010.         The 10-year ASCVD risk score (Osmin LICONA, et al., 2019) is: 41.7%    Values used to calculate the score:      Age: 74 years      Sex: Male      Is Non- : Yes      Diabetic: Yes      Tobacco smoker: No      Systolic Blood Pressure: 133 mmHg      Is BP treated: Yes      HDL Cholesterol: 34.6 mg/dL      Total Cholesterol: 163 mg/dL    ASSESSMENT / PLAN:   1. HIV  --stable on Descovy/TIvacy  --repeat labs today     2. Hx of prostate CA  --s/p resection, stable  --regularly follows with urology     3. DM  --uncontrolled, Hba1c 7.5  --continue lantus, jardiance  --f/up endocrin     4. HTN/HLD/CAD  --continue current meds  --follow up with cardiology     5. CKD  --stable  --follow with nephrology     6. Wellness check  --UTD  --RTC in 6 mo        Viral Quintanilla MD MPH

## 2024-11-21 ENCOUNTER — TELEPHONE (OUTPATIENT)
Dept: ENDOCRINOLOGY | Facility: CLINIC | Age: 74
End: 2024-11-21
Payer: MEDICARE

## 2024-11-21 LAB
CD3+CD4+ CELLS # BLD: 1.2 X10E9/L
CD3+CD4+ CELLS # BLD: 1199 /MM3
CD3+CD4+ CELLS NFR BLD: 51 %
CD3+CD4+ CELLS/CD3+CD8+ CLL BLD: 1.5 %
CD3+CD8+ CELLS # BLD: 0.8 X10E9/L
CD3+CD8+ CELLS NFR BLD: 34 %
HIV1 RNA # PLAS NAA DL=20: NOT DETECTED {COPIES}/ML
HIV1 RNA SPEC NAA+PROBE-LOG#: NORMAL {LOG_COPIES}/ML
LYMPHOCYTES # SPEC AUTO: 2.35 X10*3/UL
RPR SER QL: NONREACTIVE

## 2024-11-21 PROCEDURE — RXMED WILLOW AMBULATORY MEDICATION CHARGE

## 2024-11-21 NOTE — PROGRESS NOTES
"Nutrition Assessment     Reason for Visit:  hTompson Carranza is a 74 y.o. male who was seen today 2/2 MD consult uncontrolled T2DM and pt interest in weight loss.    Anthropometrics:  Height: 5'7\"  Weight: 220#   BMI: 34.5   Weight change: 4# weight gain in the past 1 month.   10/31/24: 216#  09/19/24: 215#   08/29/24: 216#     Food And Nutrient Intake:  Food and Nutrient History  Food and Nutrient History: Pt was seen today 2/2 MD consult uncontrolled T2DM and pt interest in weight loss. Last HbA1c: 7.5% 7/23/23. He is concerned about 4# weight gain in the past 1 month (216# 10/31/24 -> 220# 11/20/24). He has been taking 0.5 mg Ozempic weekly for the past ~2 months. Also taking Jardiance 10 and Basaglar (rx: 18 AM, 26 PM). Next endo appt: 12/30/24. In review of intake, he currently averages 3 meals/day. Breakfast: egg whites and wheat toast OR oatmeal OR grits, turkey sausage.  AM beverage: de-caff coffee with Splenda x 2. Lunch: rice or potato, vegetable, chicken or fish or steak (once/week). Enjoys rotisserie chicken from grocery store with salad or vegetables + starch. May also have noodle and chicken soup with olives and spinach. Dinner: soup, salad, chicken or fish. Primary starch at dinner: rice (>1 cup white). Other beverage choices: sugar-free tea/punch. He denied SSBs. Snacks: sugar-free ice cream, sugar-free cookies (occasionally). Physical activity: gym 4x/week in the late afternoon.     Nutrition Diagnosis   Nutrition Diagnosis  Patient has Nutrition Diagnosis: Yes  Diagnosis Status (1): New  Nutrition Diagnosis 1: Unintended weight gain  Related to (1): excessive carbohydrate intake  As Evidenced by (1): diet recall revealing regular intake of rice exceeding 1 cup at main meals; 4# unintentional weight gain in the past 1 month.    Nutrition Interventions/Recommendations   Food and Nutrition Delivery  Meals & Snacks: Carbohydrate-modified diet, Energy-modified diet  Goals: This RD reviewed at length, " nutrition strategies to facilitate weight reduction and glycemic improvement. He was amenable to incorporating the following recommendations at this time. 1. Prioritize non-starchy vegetables and lean protein at main meals, 2. Limit intake of starches to 1 cup at main meals, 3. Continue with current exercise regimen and walk for at least 5 minutes after main meals. Intake sequence, glycemic index, sugar alcohols, and artificial sweeteners was also reviewed. He was engaged throughout the interaction and demonstrated excellent understanding of all content/recommendations discussed.    Nutrition Monitoring and Evaluation   Food/Nutrient Related History Monitoring  Monitoring and Evaluation Plan: Energy intake, Amount of food, Carbohydrate intake  Body Composition/Growth/Weight History  Monitoring and Evaluation Plan: Weight change  Weight Change: Weight loss  Criteria: Current weight loss goal: 5-10%  Biochemical Data, Medical Tests and Procedures  Monitoring and Evaluation Plan: Glucose/endocrine profile  Glucose/Endocrine Profile: Hemoglobin A1c (HgbA1c)  Criteria: < 7.0%    Follow-up   Progress will be assessed in 1 month. The estimated number of remaining follow-ups at this time: 6+ at a frequency of ~Q1 month. The total number and frequency of remaining follow-ups may change depending on patient's progress.    Time spent with patient: 75 minutes of which 50 percent or greater was spent counseling and or coordinating care.

## 2024-11-21 NOTE — TELEPHONE ENCOUNTER
Patient states that he met with dietician and he was advised that he should call to have his ozempic 0.5 dose increased as he has gained 4 pounds over the past few months and he has been watching his diet and exercising.     Next ov 12/30/24

## 2024-11-22 RX ORDER — EMTRICITABINE AND TENOFOVIR ALAFENAMIDE 200; 25 MG/1; MG/1
1 TABLET ORAL DAILY
Qty: 30 TABLET | Refills: 5 | Status: SHIPPED | OUTPATIENT
Start: 2024-11-22

## 2024-11-22 RX ORDER — DOLUTEGRAVIR SODIUM 50 MG/1
50 TABLET, FILM COATED ORAL DAILY
Qty: 30 TABLET | Refills: 5 | Status: SHIPPED | OUTPATIENT
Start: 2024-11-22

## 2024-11-23 ENCOUNTER — PHARMACY VISIT (OUTPATIENT)
Dept: PHARMACY | Facility: CLINIC | Age: 74
End: 2024-11-23
Payer: MEDICARE

## 2024-12-04 DIAGNOSIS — I50.22 HEART FAILURE, SYSTOLIC, CHRONIC: ICD-10-CM

## 2024-12-04 DIAGNOSIS — I10 HYPERTENSION, UNSPECIFIED TYPE: Primary | ICD-10-CM

## 2024-12-04 PROCEDURE — RXMED WILLOW AMBULATORY MEDICATION CHARGE

## 2024-12-04 RX ORDER — ISOSORBIDE DINITRATE 10 MG/1
10 TABLET ORAL 3 TIMES DAILY
Qty: 270 TABLET | Refills: 3 | Status: SHIPPED | OUTPATIENT
Start: 2024-12-04 | End: 2025-12-04

## 2024-12-04 RX ORDER — HYDRALAZINE HYDROCHLORIDE 10 MG/1
10 TABLET, FILM COATED ORAL 3 TIMES DAILY
Qty: 270 TABLET | Refills: 3 | Status: SHIPPED | OUTPATIENT
Start: 2024-12-04 | End: 2025-12-04

## 2024-12-06 ENCOUNTER — PHARMACY VISIT (OUTPATIENT)
Dept: PHARMACY | Facility: CLINIC | Age: 74
End: 2024-12-06
Payer: MEDICARE

## 2024-12-10 DIAGNOSIS — I50.22 HEART FAILURE, SYSTOLIC, CHRONIC: ICD-10-CM

## 2024-12-10 PROCEDURE — RXMED WILLOW AMBULATORY MEDICATION CHARGE

## 2024-12-10 RX ORDER — METOPROLOL SUCCINATE 200 MG/1
200 TABLET, EXTENDED RELEASE ORAL DAILY
Qty: 90 TABLET | Refills: 3 | Status: SHIPPED | OUTPATIENT
Start: 2024-12-10 | End: 2025-12-10

## 2024-12-10 RX ORDER — CLOPIDOGREL BISULFATE 75 MG/1
75 TABLET ORAL DAILY
Qty: 90 TABLET | Refills: 3 | Status: SHIPPED | OUTPATIENT
Start: 2024-12-10

## 2024-12-11 ENCOUNTER — PHARMACY VISIT (OUTPATIENT)
Dept: PHARMACY | Facility: CLINIC | Age: 74
End: 2024-12-11
Payer: MEDICARE

## 2024-12-16 ENCOUNTER — TELEPHONE (OUTPATIENT)
Dept: IMMUNOLOGY | Facility: CLINIC | Age: 74
End: 2024-12-16
Payer: MEDICARE

## 2024-12-16 NOTE — TELEPHONE ENCOUNTER
Pt was called today for follow-up. He stated that he is in the process of moving and preferred to postpone follow-up for 1 month. This RD will follow-up in 1 month.

## 2024-12-18 DIAGNOSIS — E11.9 TYPE 2 DIABETES MELLITUS WITHOUT COMPLICATION, WITH LONG-TERM CURRENT USE OF INSULIN (MULTI): ICD-10-CM

## 2024-12-18 DIAGNOSIS — Z79.4 TYPE 2 DIABETES MELLITUS WITHOUT COMPLICATION, WITH LONG-TERM CURRENT USE OF INSULIN (MULTI): ICD-10-CM

## 2024-12-18 PROCEDURE — RXMED WILLOW AMBULATORY MEDICATION CHARGE

## 2024-12-18 RX ORDER — INSULIN GLARGINE 100 [IU]/ML
INJECTION, SOLUTION SUBCUTANEOUS
Qty: 15 ML | Refills: 2 | Status: SHIPPED | OUTPATIENT
Start: 2024-12-18

## 2024-12-19 ENCOUNTER — APPOINTMENT (OUTPATIENT)
Dept: PRIMARY CARE | Facility: CLINIC | Age: 74
End: 2024-12-19
Payer: MEDICARE

## 2024-12-19 VITALS
WEIGHT: 216 LBS | DIASTOLIC BLOOD PRESSURE: 77 MMHG | SYSTOLIC BLOOD PRESSURE: 132 MMHG | TEMPERATURE: 97.2 F | HEART RATE: 63 BPM | OXYGEN SATURATION: 94 % | BODY MASS INDEX: 33.83 KG/M2

## 2024-12-19 DIAGNOSIS — R31.21 ASYMPTOMATIC MICROSCOPIC HEMATURIA: Primary | ICD-10-CM

## 2024-12-19 DIAGNOSIS — Z00.00 ROUTINE GENERAL MEDICAL EXAMINATION AT HEALTH CARE FACILITY: ICD-10-CM

## 2024-12-19 DIAGNOSIS — M25.59 PAIN IN OTHER JOINT: ICD-10-CM

## 2024-12-19 LAB
APPEARANCE UR: CLEAR
BILIRUB UR QL STRIP: NEGATIVE
COLOR UR: YELLOW
GLUCOSE UR STRIP-MCNC: ABNORMAL MG/DL
HGB UR QL STRIP: ABNORMAL
KETONES UR STRIP-MCNC: NEGATIVE MG/DL
LEUKOCYTE ESTERASE UR QL STRIP: NEGATIVE
NITRITE UR QL STRIP: NEGATIVE
PH UR STRIP: 5 [PH]
PROT UR STRIP-MCNC: ABNORMAL MG/DL
SP GR UR STRIP.AUTO: 1.02
UROBILINOGEN UR STRIP-ACNC: 0.2 E.U./DL

## 2024-12-19 PROCEDURE — 99214 OFFICE O/P EST MOD 30 MIN: CPT | Performed by: INTERNAL MEDICINE

## 2024-12-19 PROCEDURE — RXMED WILLOW AMBULATORY MEDICATION CHARGE

## 2024-12-19 PROCEDURE — 1124F ACP DISCUSS-NO DSCNMKR DOCD: CPT | Performed by: INTERNAL MEDICINE

## 2024-12-19 PROCEDURE — 1036F TOBACCO NON-USER: CPT | Performed by: INTERNAL MEDICINE

## 2024-12-19 PROCEDURE — 3046F HEMOGLOBIN A1C LEVEL >9.0%: CPT | Performed by: INTERNAL MEDICINE

## 2024-12-19 PROCEDURE — 1159F MED LIST DOCD IN RCRD: CPT | Performed by: INTERNAL MEDICINE

## 2024-12-19 PROCEDURE — 3078F DIAST BP <80 MM HG: CPT | Performed by: INTERNAL MEDICINE

## 2024-12-19 PROCEDURE — 1160F RVW MEDS BY RX/DR IN RCRD: CPT | Performed by: INTERNAL MEDICINE

## 2024-12-19 PROCEDURE — 1170F FXNL STATUS ASSESSED: CPT | Performed by: INTERNAL MEDICINE

## 2024-12-19 PROCEDURE — 3048F LDL-C <100 MG/DL: CPT | Performed by: INTERNAL MEDICINE

## 2024-12-19 PROCEDURE — 3075F SYST BP GE 130 - 139MM HG: CPT | Performed by: INTERNAL MEDICINE

## 2024-12-19 PROCEDURE — G0439 PPPS, SUBSEQ VISIT: HCPCS | Performed by: INTERNAL MEDICINE

## 2024-12-19 PROCEDURE — 81003 URINALYSIS AUTO W/O SCOPE: CPT | Performed by: INTERNAL MEDICINE

## 2024-12-19 PROCEDURE — 4274F FLU IMMUNO ADMIND RCVD: CPT | Performed by: INTERNAL MEDICINE

## 2024-12-19 RX ORDER — PREDNISONE 10 MG/1
10 TABLET ORAL DAILY
Qty: 21 TABLET | Refills: 8 | Status: SHIPPED | OUTPATIENT
Start: 2024-12-19 | End: 2025-06-17

## 2024-12-19 ASSESSMENT — ACTIVITIES OF DAILY LIVING (ADL)
PREPARING MEALS: INDEPENDENT
TOILETING: INDEPENDENT
MANAGING FINANCES: INDEPENDENT
DOING HOUSEWORK: INDEPENDENT
TAKING MEDICATION: INDEPENDENT
STIL DRIVING: YES
DRESSING YOURSELF: INDEPENDENT
PILL BOX USED: NO
BATHING: INDEPENDENT
WALKS IN HOME: INDEPENDENT
HEARING - LEFT EAR: HEARING AID
NEEDS ASSISTANCE WITH FOOD: INDEPENDENT
GROOMING: INDEPENDENT
USING TRANSPORTATION: INDEPENDENT
EATING: INDEPENDENT
JUDGMENT_ADEQUATE_SAFELY_COMPLETE_DAILY_ACTIVITIES: YES
GROCERY SHOPPING: INDEPENDENT
FEEDING YOURSELF: INDEPENDENT
ADEQUATE_TO_COMPLETE_ADL: YES
PATIENT'S MEMORY ADEQUATE TO SAFELY COMPLETE DAILY ACTIVITIES?: NO
HEARING - RIGHT EAR: HEARING AID
USING TELEPHONE: INDEPENDENT

## 2024-12-19 ASSESSMENT — ENCOUNTER SYMPTOMS
DEPRESSION: 1
LOSS OF SENSATION IN FEET: 0
OCCASIONAL FEELINGS OF UNSTEADINESS: 0

## 2024-12-19 ASSESSMENT — GERIATRIC MINI NUTRITIONAL ASSESSMENT (MNA)
D HAS SUFFERED PSYCHOLOGICAL STRESS OR ACUTE DISEASE IN THE PAST 3 MONTHS?: NO
C GENERAL MOBILITY: GOES OUT
A HAS FOOD INTAKE DECLINED OVER THE PAST 3 MONTHS DUE TO LOSS OF APPETITE, DIGESTIVE PROBLEMS, CHEWING OR SWALLOWING DIFFICULTIES?: NO DECREASE IN FOOD INTAKE
E NEUROPSYCHOLOGICAL PROBLEMS: NO PSYCHOLOGICAL PROBLEMS
B WEIGHT LOSS DURING THE LAST 3 MONTHS: NO WEIGHT LOSS

## 2024-12-19 ASSESSMENT — COGNITIVE AND FUNCTIONAL STATUS - GENERAL
TRAIL MAKING TEST: PATIENT COMPLETES TRAIL MAKING TEST PROPERLY.
VERBAL FLUENCY - ANIMAL NAMES (0 TO 25): 24

## 2024-12-19 ASSESSMENT — COLUMBIA-SUICIDE SEVERITY RATING SCALE - C-SSRS
6. HAVE YOU EVER DONE ANYTHING, STARTED TO DO ANYTHING, OR PREPARED TO DO ANYTHING TO END YOUR LIFE?: NO
1. IN THE PAST MONTH, HAVE YOU WISHED YOU WERE DEAD OR WISHED YOU COULD GO TO SLEEP AND NOT WAKE UP?: NO
2. HAVE YOU ACTUALLY HAD ANY THOUGHTS OF KILLING YOURSELF?: NO

## 2024-12-19 NOTE — PROGRESS NOTES
Subjective   Reason for Visit: Thompson Carranza is an 74 y.o. male here for a Medicare Wellness visit.          Reviewed all medications by prescribing practitioner or clinical pharmacist (such as prescriptions, OTCs, herbal therapies and supplements) and documented in the medical record.    HPI  74 years old male comes in to see me today for his subsequent Medicare wellness exam and follow-up visit.  He is complaining of serious and severe joint pain and most of his joint.  Sees rheumatology but stopped seeing him for the last couple years.  Aches all over the place.  Take 6 Tylenol at that time which made me having palpitations and I told him right away to stop that habit which could be intoxicating to his liver and he is having no improvement in his pain or controlled.  He need to see his rheumatologist.  Medication reviewed and reconciled.  He takes Eliquis 5 mg a day, Plavix 75 mg a day, Tivicay 50 mg once a day, Jardiance 10 mg a day, Descovy 200/25 mg once a day Inspra 1 tablet daily.  Zetia 10 mg a day.  Apresoline or hydralazine 10 mg 3 times a day, Basaglar insulin 18 units in the morning and 28 units in the evening.  Toprol- mg a day, metoprolol.  Entresto 97/103 mg 1 tablet twice a day and Ozempic 0.5 mg subcu every 7 days  Agreed to start him on prednisone once a day to help with his aches and pain and to stop or discontinue Tylenol.  Was treated for HIV and still under treatment by specialist.  Lives in [place] and he is moving to [place].  Lives alone no children and never .  No known allergies.  Non-smoker now he quit in 2008, he smokes less than a pack a day.  Quit drinking in 2008.  Fully retired today he is starting a new job and  for good well.  He was a .  Colonoscopy would be due in 2028.  Patient Care Team:  Yonas Phan MD as PCP - General (Internal Medicine)     Review of Systems  12 system review 12 system negative except for  serious aches and pain in all his joints.  Objective   Vitals:  /77 (BP Location: Right arm, Patient Position: Sitting, BP Cuff Size: Adult)   Pulse 63   Temp 36.2 °C (97.2 °F) (Temporal)   Wt 98 kg (216 lb)   SpO2 94%   BMI 33.83 kg/m²       Physical Exam  Alert oriented in no distress pleasant cooperative.  Nonicteric sclera or jaundice.  Face symmetrical cranial nerves intact.  Neck supple no masses lymph no thyromegaly or jugular venous distention no carotid bruits.  Lungs clear no rales wheezing or crackles.  Heart normal S1 and S2 regular rhythm.  Abdomen benign nontender no masses no organomegaly.  Neurological exam intact equal strength in the upper and lower extremities.  Normal speech and normal cranial nerves.  Skin intac  Discontinue Tylenol since it does not help.  We started you on prednisone 10 mg/day for 7 to 10 days.  Blood sugar today 141.  t.  Assessment & Plan  Asymptomatic microscopic hematuria    Orders:    POCT UA (Automated) docked device    Routine general medical examination at health care facility    Orders:    1 Year Follow Up In Primary Care - Wellness Exam; Future

## 2024-12-21 ENCOUNTER — PHARMACY VISIT (OUTPATIENT)
Dept: PHARMACY | Facility: CLINIC | Age: 74
End: 2024-12-21
Payer: MEDICARE

## 2024-12-21 DIAGNOSIS — I50.22 HEART FAILURE, SYSTOLIC, CHRONIC: ICD-10-CM

## 2024-12-25 RX ORDER — EZETIMIBE 10 MG/1
10 TABLET ORAL NIGHTLY
Qty: 90 TABLET | Refills: 0 | Status: SHIPPED | OUTPATIENT
Start: 2024-12-25 | End: 2025-03-26

## 2024-12-26 ENCOUNTER — PHARMACY VISIT (OUTPATIENT)
Dept: PHARMACY | Facility: CLINIC | Age: 74
End: 2024-12-26
Payer: MEDICARE

## 2024-12-26 PROCEDURE — RXMED WILLOW AMBULATORY MEDICATION CHARGE

## 2024-12-30 ENCOUNTER — TELEPHONE (OUTPATIENT)
Dept: PRIMARY CARE | Facility: CLINIC | Age: 74
End: 2024-12-30

## 2024-12-30 ENCOUNTER — LAB (OUTPATIENT)
Dept: LAB | Facility: LAB | Age: 74
End: 2024-12-30
Payer: MEDICARE

## 2024-12-30 ENCOUNTER — APPOINTMENT (OUTPATIENT)
Dept: ENDOCRINOLOGY | Facility: CLINIC | Age: 74
End: 2024-12-30
Payer: MEDICARE

## 2024-12-30 VITALS
BODY MASS INDEX: 33.56 KG/M2 | SYSTOLIC BLOOD PRESSURE: 124 MMHG | DIASTOLIC BLOOD PRESSURE: 82 MMHG | HEIGHT: 67 IN | HEART RATE: 76 BPM | RESPIRATION RATE: 16 BRPM | WEIGHT: 213.8 LBS

## 2024-12-30 DIAGNOSIS — E11.65 INADEQUATELY CONTROLLED DIABETES MELLITUS (MULTI): ICD-10-CM

## 2024-12-30 DIAGNOSIS — I10 HYPERTENSION, UNSPECIFIED TYPE: ICD-10-CM

## 2024-12-30 DIAGNOSIS — E78.5 HYPERLIPIDEMIA, UNSPECIFIED HYPERLIPIDEMIA TYPE: ICD-10-CM

## 2024-12-30 DIAGNOSIS — E11.65 INADEQUATELY CONTROLLED DIABETES MELLITUS (MULTI): Primary | ICD-10-CM

## 2024-12-30 LAB
ALBUMIN SERPL BCP-MCNC: 4.1 G/DL (ref 3.4–5)
ALP SERPL-CCNC: 66 U/L (ref 33–136)
ALT SERPL W P-5'-P-CCNC: 34 U/L (ref 10–52)
ANION GAP SERPL CALC-SCNC: 14 MMOL/L (ref 10–20)
AST SERPL W P-5'-P-CCNC: 22 U/L (ref 9–39)
BILIRUB SERPL-MCNC: 0.4 MG/DL (ref 0–1.2)
BUN SERPL-MCNC: 24 MG/DL (ref 6–23)
CALCIUM SERPL-MCNC: 9.4 MG/DL (ref 8.6–10.6)
CHLORIDE SERPL-SCNC: 103 MMOL/L (ref 98–107)
CO2 SERPL-SCNC: 26 MMOL/L (ref 21–32)
CREAT SERPL-MCNC: 1.24 MG/DL (ref 0.5–1.3)
EGFRCR SERPLBLD CKD-EPI 2021: 61 ML/MIN/1.73M*2
EST. AVERAGE GLUCOSE BLD GHB EST-MCNC: 163 MG/DL
GLUCOSE SERPL-MCNC: 61 MG/DL (ref 74–99)
HBA1C MFR BLD: 7.3 %
POTASSIUM SERPL-SCNC: 4.4 MMOL/L (ref 3.5–5.3)
PROT SERPL-MCNC: 7.5 G/DL (ref 6.4–8.2)
SODIUM SERPL-SCNC: 139 MMOL/L (ref 136–145)

## 2024-12-30 PROCEDURE — 3046F HEMOGLOBIN A1C LEVEL >9.0%: CPT | Performed by: INTERNAL MEDICINE

## 2024-12-30 PROCEDURE — 83036 HEMOGLOBIN GLYCOSYLATED A1C: CPT

## 2024-12-30 PROCEDURE — 1160F RVW MEDS BY RX/DR IN RCRD: CPT | Performed by: INTERNAL MEDICINE

## 2024-12-30 PROCEDURE — RXMED WILLOW AMBULATORY MEDICATION CHARGE

## 2024-12-30 PROCEDURE — 99214 OFFICE O/P EST MOD 30 MIN: CPT | Performed by: INTERNAL MEDICINE

## 2024-12-30 PROCEDURE — 1036F TOBACCO NON-USER: CPT | Performed by: INTERNAL MEDICINE

## 2024-12-30 PROCEDURE — 3074F SYST BP LT 130 MM HG: CPT | Performed by: INTERNAL MEDICINE

## 2024-12-30 PROCEDURE — G2211 COMPLEX E/M VISIT ADD ON: HCPCS | Performed by: INTERNAL MEDICINE

## 2024-12-30 PROCEDURE — 1159F MED LIST DOCD IN RCRD: CPT | Performed by: INTERNAL MEDICINE

## 2024-12-30 PROCEDURE — 4274F FLU IMMUNO ADMIND RCVD: CPT | Performed by: INTERNAL MEDICINE

## 2024-12-30 PROCEDURE — 3048F LDL-C <100 MG/DL: CPT | Performed by: INTERNAL MEDICINE

## 2024-12-30 PROCEDURE — 3079F DIAST BP 80-89 MM HG: CPT | Performed by: INTERNAL MEDICINE

## 2024-12-30 PROCEDURE — 80053 COMPREHEN METABOLIC PANEL: CPT

## 2024-12-30 RX ORDER — SEMAGLUTIDE 1.34 MG/ML
1 INJECTION, SOLUTION SUBCUTANEOUS
Qty: 9 ML | Refills: 1 | Status: SHIPPED | OUTPATIENT
Start: 2024-12-30 | End: 2025-12-30

## 2024-12-30 ASSESSMENT — ENCOUNTER SYMPTOMS
DIZZINESS: 0
DIARRHEA: 0
VOMITING: 0
FEVER: 0
SHORTNESS OF BREATH: 0
CHILLS: 0
LIGHT-HEADEDNESS: 0
NAUSEA: 0

## 2024-12-30 NOTE — PATIENT INSTRUCTIONS
Increase ozempic to 1 mg  Work on eating and activity  Labs today  Follow up in 4 months  Bring diane to visits

## 2024-12-30 NOTE — TELEPHONE ENCOUNTER
----- Message from Yonas Phan sent at 12/28/2024  2:30 PM EST -----  Regarding: r  The urine test done at the last visit here reveals sugar in the urine because you taking Jardiance so that is expected little protein in the urine and trace of intact blood.  I hope you are not taking Tylenol as he used to you could take it only 2 of them 4 times a day or 3 times a day if he is still taking 6 tablets every time or more than that urine doing your self at the service and putting your organs in jeopardy specifically your liver it could be intoxicated so please as we discussed calm down and take less of those pills I gave you prednisone to help you out with your joint pain.  Is that you are doing you any good?  Stay in touch let me know how it goes.
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
## 2025-01-01 DIAGNOSIS — E11.3293 TYPE 2 DIABETES MELLITUS WITH MILD NONPROLIFERATIVE RETINOPATHY OF BOTH EYES WITHOUT MACULAR EDEMA, UNSPECIFIED WHETHER LONG TERM INSULIN USE: ICD-10-CM

## 2025-01-02 RX ORDER — BLOOD SUGAR DIAGNOSTIC
STRIP MISCELLANEOUS
Qty: 200 EACH | Refills: 2 | OUTPATIENT
Start: 2025-01-02 | End: 2026-01-01

## 2025-01-06 ENCOUNTER — APPOINTMENT (OUTPATIENT)
Dept: SLEEP MEDICINE | Facility: CLINIC | Age: 75
End: 2025-01-06
Payer: MEDICARE

## 2025-01-08 ENCOUNTER — PHARMACY VISIT (OUTPATIENT)
Dept: PHARMACY | Facility: CLINIC | Age: 75
End: 2025-01-08
Payer: MEDICARE

## 2025-01-08 PROCEDURE — RXMED WILLOW AMBULATORY MEDICATION CHARGE

## 2025-01-13 PROCEDURE — RXMED WILLOW AMBULATORY MEDICATION CHARGE

## 2025-01-16 ENCOUNTER — TELEPHONE (OUTPATIENT)
Dept: IMMUNOLOGY | Facility: CLINIC | Age: 75
End: 2025-01-16
Payer: MEDICARE

## 2025-01-17 ENCOUNTER — PHARMACY VISIT (OUTPATIENT)
Dept: PHARMACY | Facility: CLINIC | Age: 75
End: 2025-01-17
Payer: MEDICARE

## 2025-01-19 DIAGNOSIS — I50.22 HEART FAILURE, SYSTOLIC, CHRONIC: Primary | ICD-10-CM

## 2025-01-19 DIAGNOSIS — I11.0 HYPERTENSIVE HEART DISEASE WITH HEART FAILURE: ICD-10-CM

## 2025-01-19 DIAGNOSIS — I50.41 ACUTE COMBINED SYSTOLIC AND DIASTOLIC CONGESTIVE HEART FAILURE: ICD-10-CM

## 2025-01-20 PROCEDURE — RXMED WILLOW AMBULATORY MEDICATION CHARGE

## 2025-01-20 RX ORDER — SACUBITRIL AND VALSARTAN 97; 103 MG/1; MG/1
1 TABLET, FILM COATED ORAL 2 TIMES DAILY
Qty: 180 TABLET | Refills: 2 | Status: SHIPPED | OUTPATIENT
Start: 2025-01-20

## 2025-01-21 ENCOUNTER — PHARMACY VISIT (OUTPATIENT)
Dept: PHARMACY | Facility: CLINIC | Age: 75
End: 2025-01-21
Payer: MEDICARE

## 2025-01-27 DIAGNOSIS — I48.91 ATRIAL FIBRILLATION, UNSPECIFIED TYPE (MULTI): ICD-10-CM

## 2025-01-27 DIAGNOSIS — I50.41 ACUTE COMBINED SYSTOLIC AND DIASTOLIC CONGESTIVE HEART FAILURE: Primary | ICD-10-CM

## 2025-01-27 PROCEDURE — RXMED WILLOW AMBULATORY MEDICATION CHARGE

## 2025-01-28 ENCOUNTER — PHARMACY VISIT (OUTPATIENT)
Dept: PHARMACY | Facility: CLINIC | Age: 75
End: 2025-01-28
Payer: MEDICARE

## 2025-01-31 PROCEDURE — RXMED WILLOW AMBULATORY MEDICATION CHARGE

## 2025-02-05 DIAGNOSIS — E11.65 INADEQUATELY CONTROLLED DIABETES MELLITUS (MULTI): ICD-10-CM

## 2025-02-05 PROCEDURE — RXMED WILLOW AMBULATORY MEDICATION CHARGE

## 2025-02-05 RX ORDER — FLASH GLUCOSE SENSOR
KIT MISCELLANEOUS
Qty: 6 EACH | Refills: 3 | Status: SHIPPED | OUTPATIENT
Start: 2025-02-05

## 2025-02-07 ENCOUNTER — PHARMACY VISIT (OUTPATIENT)
Dept: PHARMACY | Facility: CLINIC | Age: 75
End: 2025-02-07
Payer: MEDICARE

## 2025-02-07 PROCEDURE — RXMED WILLOW AMBULATORY MEDICATION CHARGE

## 2025-02-08 ENCOUNTER — PHARMACY VISIT (OUTPATIENT)
Dept: PHARMACY | Facility: CLINIC | Age: 75
End: 2025-02-08
Payer: COMMERCIAL

## 2025-02-13 DIAGNOSIS — I50.22 HEART FAILURE, SYSTOLIC, CHRONIC: ICD-10-CM

## 2025-02-13 RX ORDER — EPLERENONE 25 MG/1
25 TABLET, FILM COATED ORAL DAILY
Qty: 90 TABLET | Refills: 2 | Status: SHIPPED | OUTPATIENT
Start: 2025-02-13 | End: 2025-11-10

## 2025-02-26 DIAGNOSIS — Z79.4 TYPE 2 DIABETES MELLITUS WITHOUT COMPLICATION, WITH LONG-TERM CURRENT USE OF INSULIN (MULTI): ICD-10-CM

## 2025-02-26 DIAGNOSIS — E11.9 TYPE 2 DIABETES MELLITUS WITHOUT COMPLICATION, WITH LONG-TERM CURRENT USE OF INSULIN (MULTI): ICD-10-CM

## 2025-02-28 PROCEDURE — RXMED WILLOW AMBULATORY MEDICATION CHARGE

## 2025-03-01 RX ORDER — INSULIN GLARGINE 100 [IU]/ML
INJECTION, SOLUTION SUBCUTANEOUS
Qty: 15 ML | Refills: 2 | Status: SHIPPED | OUTPATIENT
Start: 2025-03-01

## 2025-03-02 PROCEDURE — RXMED WILLOW AMBULATORY MEDICATION CHARGE

## 2025-03-03 ENCOUNTER — PHARMACY VISIT (OUTPATIENT)
Dept: PHARMACY | Facility: CLINIC | Age: 75
End: 2025-03-03
Payer: COMMERCIAL

## 2025-03-04 PROCEDURE — RXMED WILLOW AMBULATORY MEDICATION CHARGE

## 2025-03-05 ENCOUNTER — PHARMACY VISIT (OUTPATIENT)
Dept: PHARMACY | Facility: CLINIC | Age: 75
End: 2025-03-05
Payer: COMMERCIAL

## 2025-03-05 DIAGNOSIS — E11.9 TYPE 2 DIABETES MELLITUS WITHOUT COMPLICATION, WITHOUT LONG-TERM CURRENT USE OF INSULIN (MULTI): Primary | ICD-10-CM

## 2025-03-05 PROCEDURE — RXOTC WILLOW AMBULATORY OTC CHARGE

## 2025-03-05 RX ORDER — LANCETS 33 GAUGE
EACH MISCELLANEOUS
Qty: 200 EACH | Refills: 2 | Status: SHIPPED | OUTPATIENT
Start: 2025-03-05 | End: 2026-03-04

## 2025-03-05 RX ORDER — LANCETS 33 GAUGE
1 EACH MISCELLANEOUS 2 TIMES DAILY
Qty: 100 EACH | Refills: 3 | Status: SHIPPED | OUTPATIENT
Start: 2025-03-05 | End: 2026-03-05

## 2025-03-06 ENCOUNTER — TELEMEDICINE (OUTPATIENT)
Dept: CARDIOLOGY | Facility: CLINIC | Age: 75
End: 2025-03-06
Payer: MEDICARE

## 2025-03-06 ENCOUNTER — APPOINTMENT (OUTPATIENT)
Dept: RHEUMATOLOGY | Facility: CLINIC | Age: 75
End: 2025-03-06
Payer: MEDICARE

## 2025-03-12 PROCEDURE — RXMED WILLOW AMBULATORY MEDICATION CHARGE

## 2025-03-13 ENCOUNTER — PHARMACY VISIT (OUTPATIENT)
Dept: PHARMACY | Facility: CLINIC | Age: 75
End: 2025-03-13
Payer: COMMERCIAL

## 2025-03-13 PROCEDURE — RXMED WILLOW AMBULATORY MEDICATION CHARGE

## 2025-03-14 ENCOUNTER — PHARMACY VISIT (OUTPATIENT)
Dept: PHARMACY | Facility: CLINIC | Age: 75
End: 2025-03-14
Payer: COMMERCIAL

## 2025-03-14 PROCEDURE — RXMED WILLOW AMBULATORY MEDICATION CHARGE

## 2025-03-19 ENCOUNTER — PHARMACY VISIT (OUTPATIENT)
Dept: PHARMACY | Facility: CLINIC | Age: 75
End: 2025-03-19
Payer: MEDICARE

## 2025-03-20 ENCOUNTER — APPOINTMENT (OUTPATIENT)
Dept: PRIMARY CARE | Facility: CLINIC | Age: 75
End: 2025-03-20
Payer: MEDICARE

## 2025-03-20 VITALS
SYSTOLIC BLOOD PRESSURE: 177 MMHG | DIASTOLIC BLOOD PRESSURE: 94 MMHG | OXYGEN SATURATION: 95 % | BODY MASS INDEX: 35.4 KG/M2 | TEMPERATURE: 97.3 F | WEIGHT: 226 LBS | HEART RATE: 75 BPM

## 2025-03-20 DIAGNOSIS — D64.9 ANEMIA, UNSPECIFIED TYPE: ICD-10-CM

## 2025-03-20 DIAGNOSIS — I10 HYPERTENSION, UNSPECIFIED TYPE: Primary | ICD-10-CM

## 2025-03-20 DIAGNOSIS — F32.9 REACTIVE DEPRESSION: ICD-10-CM

## 2025-03-20 DIAGNOSIS — E11.69 TYPE 2 DIABETES MELLITUS WITH OTHER SPECIFIED COMPLICATION, UNSPECIFIED WHETHER LONG TERM INSULIN USE (MULTI): ICD-10-CM

## 2025-03-20 DIAGNOSIS — N18.31 TYPE 2 DIABETES MELLITUS WITH STAGE 3A CHRONIC KIDNEY DISEASE, UNSPECIFIED WHETHER LONG TERM INSULIN USE (MULTI): ICD-10-CM

## 2025-03-20 DIAGNOSIS — I49.5 SICK SINUS SYNDROME (MULTI): ICD-10-CM

## 2025-03-20 DIAGNOSIS — Z21 ASYMPTOMATIC HUMAN IMMUNODEFICIENCY VIRUS (HIV) INFECTION STATUS: ICD-10-CM

## 2025-03-20 DIAGNOSIS — R31.21 ASYMPTOMATIC MICROSCOPIC HEMATURIA: ICD-10-CM

## 2025-03-20 DIAGNOSIS — Z00.00 ROUTINE GENERAL MEDICAL EXAMINATION AT HEALTH CARE FACILITY: ICD-10-CM

## 2025-03-20 DIAGNOSIS — I50.9 HEART FAILURE, UNSPECIFIED HF CHRONICITY, UNSPECIFIED HEART FAILURE TYPE: ICD-10-CM

## 2025-03-20 DIAGNOSIS — E11.22 TYPE 2 DIABETES MELLITUS WITH STAGE 3A CHRONIC KIDNEY DISEASE, UNSPECIFIED WHETHER LONG TERM INSULIN USE (MULTI): ICD-10-CM

## 2025-03-20 DIAGNOSIS — C61 MALIGNANT NEOPLASM OF PROSTATE (MULTI): ICD-10-CM

## 2025-03-20 DIAGNOSIS — E78.2 MIXED HYPERLIPIDEMIA: ICD-10-CM

## 2025-03-20 DIAGNOSIS — R53.83 FATIGUE, UNSPECIFIED TYPE: ICD-10-CM

## 2025-03-20 DIAGNOSIS — E66.01 MORBID OBESITY (MULTI): ICD-10-CM

## 2025-03-20 LAB
ALBUMIN SERPL BCP-MCNC: 3.8 G/DL (ref 3.4–5)
ALP SERPL-CCNC: 80 U/L (ref 45–117)
ALT SERPL W P-5'-P-CCNC: 36 U/L (ref 14–59)
ANION GAP SERPL CALC-SCNC: 12 MMOL/L (ref 10–20)
APPEARANCE UR: CLEAR
AST SERPL W P-5'-P-CCNC: 23 U/L (ref 15–37)
BASOPHILS # BLD AUTO: 0.01 X10*3/UL (ref 0.1–1.6)
BASOPHILS NFR BLD AUTO: 0.3 % (ref 0–0.3)
BILIRUB SERPL-MCNC: 0.3 MG/DL (ref 0.2–1)
BILIRUB UR QL STRIP: NEGATIVE
BUN SERPL-MCNC: 15 MG/DL (ref 7–18)
CALCIUM SERPL-MCNC: 9.5 MG/DL (ref 8.5–10.1)
CHLORIDE SERPL-SCNC: 106 MMOL/L (ref 98–107)
CHOLEST SERPL-MCNC: 163 MG/DL (ref 0–199)
CHOLESTEROL/HDL RATIO: 4.2 (ref 4.2–7)
CO2 SERPL-SCNC: 28 MMOL/L (ref 21–32)
COLOR UR: YELLOW
CREAT SERPL-MCNC: 1.46 MG/DL (ref 0.6–1.1)
EGFRCR SERPLBLD CKD-EPI 2021: 50 ML/MIN/1.73M*2
EOSINOPHIL # BLD AUTO: 0.12 X10*3/UL (ref 0.04–0.5)
EOSINOPHIL NFR BLD AUTO: 3.37 % (ref 0.7–7)
ERYTHROCYTE [DISTWIDTH] IN BLOOD BY AUTOMATED COUNT: 14.8 % (ref 11.5–14.5)
GLUCOSE SERPL-MCNC: 120 MG/DL (ref 74–100)
GLUCOSE UR STRIP-MCNC: ABNORMAL MG/DL
HCT VFR BLD AUTO: 48.5 % (ref 38.4–51.3)
HDLC SERPL-MCNC: 39 MG/DL (ref 40–59)
HGB BLD-MCNC: 16.94 G/DL (ref 12.7–17)
HGB UR QL STRIP: ABNORMAL
IS PATIENT FASTING: ABNORMAL
KETONES UR STRIP-MCNC: NEGATIVE MG/DL
LDLC SERPL DIRECT ASSAY-MCNC: 96 MG/DL (ref 0–100)
LEUKOCYTE ESTERASE UR QL STRIP: NEGATIVE
LYMPHOCYTES # BLD AUTO: 2.18 X10*3/UL (ref 0–6)
LYMPHOCYTES NFR BLD AUTO: 59.56 % (ref 20.5–51.1)
MCH RBC QN AUTO: 29.3 PG (ref 26–32)
MCHC RBC AUTO-ENTMCNC: 34.9 G/DL (ref 31–38)
MCV RBC AUTO: 83.7 FL (ref 80–96)
MONOCYTES # BLD AUTO: 0.44 X10*3/UL (ref 1.6–24.9)
MONOCYTES NFR BLD AUTO: 12.14 % (ref 1.7–9.3)
NEUTROPHILS # BLD AUTO: 0.9 X10*3/UL (ref 1.4–6.5)
NEUTROPHILS NFR BLD AUTO: 24.63 % (ref 42.2–75.2)
NITRITE UR QL STRIP: NEGATIVE
PH UR STRIP: 5.5 [PH]
PLATELET # BLD AUTO: 212.7 X10*3/UL (ref 150–450)
PMV BLD AUTO: 8.7 FL (ref 7.8–11)
POTASSIUM SERPL-SCNC: 4.5 MMOL/L (ref 3.5–5.1)
PROT SERPL-MCNC: 7.5 G/DL (ref 6.4–8.2)
PROT UR STRIP-MCNC: ABNORMAL MG/DL
RBC # BLD AUTO: 5.79 X10*6/UL (ref 4.1–5.6)
SODIUM SERPL-SCNC: 141 MMOL/L (ref 136–145)
SP GR UR STRIP.AUTO: 1.01
TRIGL SERPL-MCNC: 127 MG/DL
TSH SERPL-ACNC: 2.03 MIU/L (ref 0.44–3.98)
UROBILINOGEN UR STRIP-ACNC: 0.2 E.U./DL
WBC # BLD AUTO: 3.66 X10*3/UL (ref 4.5–10.5)

## 2025-03-20 PROCEDURE — 1159F MED LIST DOCD IN RCRD: CPT | Performed by: INTERNAL MEDICINE

## 2025-03-20 PROCEDURE — 80061 LIPID PANEL: CPT | Performed by: INTERNAL MEDICINE

## 2025-03-20 PROCEDURE — 81003 URINALYSIS AUTO W/O SCOPE: CPT | Performed by: INTERNAL MEDICINE

## 2025-03-20 PROCEDURE — 1160F RVW MEDS BY RX/DR IN RCRD: CPT | Performed by: INTERNAL MEDICINE

## 2025-03-20 PROCEDURE — 80053 COMPREHEN METABOLIC PANEL: CPT | Performed by: INTERNAL MEDICINE

## 2025-03-20 PROCEDURE — RXMED WILLOW AMBULATORY MEDICATION CHARGE

## 2025-03-20 PROCEDURE — 84443 ASSAY THYROID STIM HORMONE: CPT | Performed by: INTERNAL MEDICINE

## 2025-03-20 PROCEDURE — 3077F SYST BP >= 140 MM HG: CPT | Performed by: INTERNAL MEDICINE

## 2025-03-20 PROCEDURE — 1036F TOBACCO NON-USER: CPT | Performed by: INTERNAL MEDICINE

## 2025-03-20 PROCEDURE — 3080F DIAST BP >= 90 MM HG: CPT | Performed by: INTERNAL MEDICINE

## 2025-03-20 PROCEDURE — 99214 OFFICE O/P EST MOD 30 MIN: CPT | Performed by: INTERNAL MEDICINE

## 2025-03-20 PROCEDURE — 3048F LDL-C <100 MG/DL: CPT | Performed by: INTERNAL MEDICINE

## 2025-03-20 PROCEDURE — 85025 COMPLETE CBC W/AUTO DIFF WBC: CPT | Performed by: INTERNAL MEDICINE

## 2025-03-20 RX ORDER — BUPROPION HYDROCHLORIDE 150 MG/1
150 TABLET ORAL EVERY MORNING
Qty: 90 TABLET | Refills: 3 | Status: SHIPPED | OUTPATIENT
Start: 2025-03-20 | End: 2026-03-20

## 2025-03-20 ASSESSMENT — PATIENT HEALTH QUESTIONNAIRE - PHQ9
1. LITTLE INTEREST OR PLEASURE IN DOING THINGS: NOT AT ALL
SUM OF ALL RESPONSES TO PHQ9 QUESTIONS 1 AND 2: 2
10. IF YOU CHECKED OFF ANY PROBLEMS, HOW DIFFICULT HAVE THESE PROBLEMS MADE IT FOR YOU TO DO YOUR WORK, TAKE CARE OF THINGS AT HOME, OR GET ALONG WITH OTHER PEOPLE: NOT DIFFICULT AT ALL
2. FEELING DOWN, DEPRESSED OR HOPELESS: MORE THAN HALF THE DAYS

## 2025-03-20 ASSESSMENT — ENCOUNTER SYMPTOMS
DEPRESSION: 1
LOSS OF SENSATION IN FEET: 0
OCCASIONAL FEELINGS OF UNSTEADINESS: 0

## 2025-03-20 NOTE — PROGRESS NOTES
Subjective   Patient ID: Thompson Carranza is a 74 y.o. male who presents for Follow-up.    HPI   74 years old male comes in to see me today for a regular checkup.  He moved to [place] now and he is living there.  He is having consultation with Dr. Alam Mendez maybe tomorrow but he is not sure.  He feels well and has no specific symptoms to complain about except he is gaining weight almost 13 pounds even though he is on insulin Jardiance and Ozempic.  I guess his diet is not well managed as a diabetic diet 1800 cathi a day.  Medical history remarkable for CAD with heart failure, atrial fibrillation with a defibrillator.  Diabetes mellitus type 2, CKD 3A, right leg trouble with circulation and questionable prostate issues.  Treated for HIV.  Colonoscopy due in 2028.  Medication reviewed and reconciled he is taking Eliquis 5 mg twice a day, Plavix 75 mg a day, Jardiance 10 mg a day, Inspra or eplerenone 25 mg a day, Zetia 10 mg a day, hydralazine 10 mg 3 times a day, insulin Basaglar 18 units in the morning and 26 units in the evening.  Isosorbide 10 mg 3 times a day, metoprolol succinate 200 mg once a day, Entresto 97/103 twice a day and Ozempic 1 mg every week.  Discussed the diet and he agreed he has to watch his diet and stay active exercise and lose weight.  Agreed to start him on Wellbutrin because he has been off sertraline for a year because he was not feeling well when he was on it.  Review of Systems  12 system review 12 system are negative  Objective   BP (!) 177/94 (BP Location: Right arm, Patient Position: Sitting, BP Cuff Size: Adult)   Pulse 75   Temp 36.3 °C (97.3 °F) (Temporal)   Wt 103 kg (226 lb)   SpO2 95%   BMI 35.40 kg/m²     Physical Exam  Alert oriented no distress emotional to the point that he is in his eye when we talked about his weight and weight gain.  Nonicteric sclera no jaundice.  Face symmetrical cranial nerves intact.  Neck supple no masses lymph no thyromegaly or  jugular venous distention no carotid bruits.  Lungs clear no rales wheezing or crackles.  Heart normal S1 and S2 regular rhythm.  Abdomen benign nontender no masses no organomegaly no pain on palpation and no epigastric murmur.  Neurological exam intact equal strength in the upper and in the lower extremities.  No sensory deficit.  Skin intact , no edema in the lower extremities.  Start him on Wellbutrin will be ordered for [facility] pharmacy  Assessment/Plan   Diagnoses and all orders for this visit:  Hypertension, unspecified type  -     Comprehensive Metabolic Panel  Anemia, unspecified type  -     CBC w/5 Part Differential, [facility]  Mixed hyperlipidemia  -     Lipid Panel  Fatigue, unspecified type  -     Thyroid Stimulating Hormone  Asymptomatic microscopic hematuria  -     POCT UA (Automated) docked device  Routine general medical examination at health care facility  -     1 Year Follow Up In Primary Care - Wellness Exam  Heart failure, unspecified HF chronicity, unspecified heart failure type  Asymptomatic human immunodeficiency virus (hiv) infection status  Sick sinus syndrome (Multi)  Malignant neoplasm of prostate (Multi)  Morbid obesity (Multi)  Type 2 diabetes mellitus with other specified complication, unspecified whether long term insulin use (Multi)  Type 2 diabetes mellitus with stage 3a chronic kidney disease, unspecified whether long term insulin use (Multi)  Other orders  -     POCT URINALYSIS AUTOMATED

## 2025-03-21 ENCOUNTER — PHARMACY VISIT (OUTPATIENT)
Dept: PHARMACY | Facility: CLINIC | Age: 75
End: 2025-03-21
Payer: COMMERCIAL

## 2025-03-21 DIAGNOSIS — I50.22 HEART FAILURE, SYSTOLIC, CHRONIC: ICD-10-CM

## 2025-03-21 PROCEDURE — RXMED WILLOW AMBULATORY MEDICATION CHARGE

## 2025-03-21 RX ORDER — EZETIMIBE 10 MG/1
10 TABLET ORAL NIGHTLY
Qty: 90 TABLET | Refills: 0 | Status: SHIPPED | OUTPATIENT
Start: 2025-03-21 | End: 2025-06-19

## 2025-03-24 ENCOUNTER — TELEPHONE (OUTPATIENT)
Dept: PRIMARY CARE | Facility: CLINIC | Age: 75
End: 2025-03-24
Payer: MEDICARE

## 2025-03-24 NOTE — TELEPHONE ENCOUNTER
----- Message from Yonas Phan sent at 3/23/2025 11:51 AM EDT -----  Regarding: r  Lab results from the last visit revealed normal complete blood count your white blood cells slightly down 3.66.  Urine shows sugar in it because you are taking medication to do that 3+.  Protein also.  Need to watch that.  Your cholesterol and lipid are normal.  Kidney function came down 2 months ago was 61 and now it is 50 with a blood sugar of 120.  Thyroid came back normal.  All in all you need to watch your diet low sugar diet diabetic diet and stay active and lose weight very important cut down on the carbohydrate and the fat hopefully next time we meet he will be losing weight since you put up 13 pounds.  Any question call the office

## 2025-03-27 ENCOUNTER — APPOINTMENT (OUTPATIENT)
Dept: NEUROLOGY | Facility: CLINIC | Age: 75
End: 2025-03-27
Payer: MEDICARE

## 2025-03-31 ENCOUNTER — PHARMACY VISIT (OUTPATIENT)
Dept: PHARMACY | Facility: CLINIC | Age: 75
End: 2025-03-31
Payer: COMMERCIAL

## 2025-03-31 PROCEDURE — RXMED WILLOW AMBULATORY MEDICATION CHARGE

## 2025-04-01 ENCOUNTER — HOSPITAL ENCOUNTER (OUTPATIENT)
Dept: CARDIOLOGY | Facility: HOSPITAL | Age: 75
Discharge: HOME | End: 2025-04-01
Payer: MEDICARE

## 2025-04-01 DIAGNOSIS — Z95.810 PRESENCE OF AUTOMATIC (IMPLANTABLE) CARDIAC DEFIBRILLATOR: Primary | ICD-10-CM

## 2025-04-01 DIAGNOSIS — I47.29 OTHER VENTRICULAR TACHYCARDIA: ICD-10-CM

## 2025-04-01 DIAGNOSIS — Z95.810 PRESENCE OF AUTOMATIC (IMPLANTABLE) CARDIAC DEFIBRILLATOR: ICD-10-CM

## 2025-04-01 PROCEDURE — 93283 PRGRMG EVAL IMPLANTABLE DFB: CPT

## 2025-04-01 PROCEDURE — 93283 PRGRMG EVAL IMPLANTABLE DFB: CPT | Performed by: INTERNAL MEDICINE

## 2025-04-10 ENCOUNTER — APPOINTMENT (OUTPATIENT)
Dept: RHEUMATOLOGY | Facility: CLINIC | Age: 75
End: 2025-04-10
Payer: MEDICARE

## 2025-04-10 VITALS
TEMPERATURE: 97.4 F | WEIGHT: 218 LBS | SYSTOLIC BLOOD PRESSURE: 132 MMHG | HEART RATE: 69 BPM | BODY MASS INDEX: 34.21 KG/M2 | DIASTOLIC BLOOD PRESSURE: 82 MMHG | HEIGHT: 67 IN

## 2025-04-10 DIAGNOSIS — M13.0 POLYARTHRITIS: Primary | ICD-10-CM

## 2025-04-10 PROCEDURE — 3075F SYST BP GE 130 - 139MM HG: CPT | Performed by: INTERNAL MEDICINE

## 2025-04-10 PROCEDURE — 99204 OFFICE O/P NEW MOD 45 MIN: CPT | Performed by: INTERNAL MEDICINE

## 2025-04-10 PROCEDURE — 1036F TOBACCO NON-USER: CPT | Performed by: INTERNAL MEDICINE

## 2025-04-10 PROCEDURE — 3048F LDL-C <100 MG/DL: CPT | Performed by: INTERNAL MEDICINE

## 2025-04-10 PROCEDURE — 3079F DIAST BP 80-89 MM HG: CPT | Performed by: INTERNAL MEDICINE

## 2025-04-10 PROCEDURE — 1160F RVW MEDS BY RX/DR IN RCRD: CPT | Performed by: INTERNAL MEDICINE

## 2025-04-10 PROCEDURE — 1126F AMNT PAIN NOTED NONE PRSNT: CPT | Performed by: INTERNAL MEDICINE

## 2025-04-10 PROCEDURE — 1159F MED LIST DOCD IN RCRD: CPT | Performed by: INTERNAL MEDICINE

## 2025-04-10 ASSESSMENT — PATIENT HEALTH QUESTIONNAIRE - PHQ9
2. FEELING DOWN, DEPRESSED OR HOPELESS: MORE THAN HALF THE DAYS
SUM OF ALL RESPONSES TO PHQ9 QUESTIONS 1 & 2: 4
1. LITTLE INTEREST OR PLEASURE IN DOING THINGS: MORE THAN HALF THE DAYS

## 2025-04-10 ASSESSMENT — PAIN SCALES - GENERAL: PAINLEVEL_OUTOF10: 0-NO PAIN
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
## 2025-04-11 LAB
CRP SERPL-MCNC: <3 MG/L
ERYTHROCYTE [SEDIMENTATION RATE] IN BLOOD BY WESTERGREN METHOD: 6 MM/H
HLA-B27 QL NAA+PROBE: NORMAL
QUEST HLAB27 TYPING RESULTS REVIEWED BY:: NORMAL
URATE SERPL-MCNC: 6 MG/DL (ref 4–8)

## 2025-04-14 ENCOUNTER — HOSPITAL ENCOUNTER (OUTPATIENT)
Dept: RADIOLOGY | Facility: HOSPITAL | Age: 75
Discharge: HOME | End: 2025-04-14
Payer: MEDICARE

## 2025-04-14 DIAGNOSIS — M13.0 POLYARTHRITIS: ICD-10-CM

## 2025-04-14 PROCEDURE — 72100 X-RAY EXAM L-S SPINE 2/3 VWS: CPT

## 2025-04-14 PROCEDURE — 73030 X-RAY EXAM OF SHOULDER: CPT | Mod: BILATERAL PROCEDURE | Performed by: RADIOLOGY

## 2025-04-14 PROCEDURE — 72190 X-RAY EXAM OF PELVIS: CPT | Performed by: RADIOLOGY

## 2025-04-14 PROCEDURE — 73030 X-RAY EXAM OF SHOULDER: CPT | Mod: 50

## 2025-04-14 PROCEDURE — 72190 X-RAY EXAM OF PELVIS: CPT

## 2025-04-17 PROCEDURE — RXMED WILLOW AMBULATORY MEDICATION CHARGE

## 2025-04-18 ENCOUNTER — PHARMACY VISIT (OUTPATIENT)
Dept: PHARMACY | Facility: CLINIC | Age: 75
End: 2025-04-18
Payer: MEDICARE

## 2025-04-18 LAB
CRP SERPL-MCNC: <3 MG/L
ERYTHROCYTE [SEDIMENTATION RATE] IN BLOOD BY WESTERGREN METHOD: 6 MM/H
HLA-B27 QL NAA+PROBE: POSITIVE
QUEST HLAB27 TYPING RESULTS REVIEWED BY:: ABNORMAL
URATE SERPL-MCNC: 6 MG/DL (ref 4–8)

## 2025-04-19 ENCOUNTER — PHARMACY VISIT (OUTPATIENT)
Dept: PHARMACY | Facility: CLINIC | Age: 75
End: 2025-04-19
Payer: MEDICARE

## 2025-04-19 PROCEDURE — RXMED WILLOW AMBULATORY MEDICATION CHARGE

## 2025-04-21 ENCOUNTER — OFFICE VISIT (OUTPATIENT)
Facility: CLINIC | Age: 75
End: 2025-04-21
Payer: MEDICARE

## 2025-04-21 VITALS
BODY MASS INDEX: 34.06 KG/M2 | HEIGHT: 67 IN | SYSTOLIC BLOOD PRESSURE: 144 MMHG | HEART RATE: 80 BPM | WEIGHT: 217 LBS | DIASTOLIC BLOOD PRESSURE: 88 MMHG | OXYGEN SATURATION: 98 %

## 2025-04-21 DIAGNOSIS — I50.22 HEART FAILURE, SYSTOLIC, CHRONIC: ICD-10-CM

## 2025-04-21 DIAGNOSIS — H91.93 HEARING IMPAIRED PERSON, BILATERAL: Primary | ICD-10-CM

## 2025-04-21 PROCEDURE — RXMED WILLOW AMBULATORY MEDICATION CHARGE

## 2025-04-21 PROCEDURE — 99215 OFFICE O/P EST HI 40 MIN: CPT | Performed by: STUDENT IN AN ORGANIZED HEALTH CARE EDUCATION/TRAINING PROGRAM

## 2025-04-21 PROCEDURE — 3079F DIAST BP 80-89 MM HG: CPT | Performed by: STUDENT IN AN ORGANIZED HEALTH CARE EDUCATION/TRAINING PROGRAM

## 2025-04-21 PROCEDURE — 1159F MED LIST DOCD IN RCRD: CPT | Performed by: STUDENT IN AN ORGANIZED HEALTH CARE EDUCATION/TRAINING PROGRAM

## 2025-04-21 PROCEDURE — 3077F SYST BP >= 140 MM HG: CPT | Performed by: STUDENT IN AN ORGANIZED HEALTH CARE EDUCATION/TRAINING PROGRAM

## 2025-04-21 PROCEDURE — 1036F TOBACCO NON-USER: CPT | Performed by: STUDENT IN AN ORGANIZED HEALTH CARE EDUCATION/TRAINING PROGRAM

## 2025-04-21 PROCEDURE — 3048F LDL-C <100 MG/DL: CPT | Performed by: STUDENT IN AN ORGANIZED HEALTH CARE EDUCATION/TRAINING PROGRAM

## 2025-04-21 ASSESSMENT — ENCOUNTER SYMPTOMS
WEAKNESS: 0
HEMATURIA: 0
CLAUDICATION: 0
COUGH: 0
WEIGHT GAIN: 0
ALTERED MENTAL STATUS: 0
PALPITATIONS: 0
HEMATOCHEZIA: 0
PND: 0
WHEEZING: 0
IRREGULAR HEARTBEAT: 0
DYSPNEA ON EXERTION: 0
SHORTNESS OF BREATH: 0
FEVER: 0
ABDOMINAL PAIN: 0
WEIGHT LOSS: 0
COLOR CHANGE: 0
ORTHOPNEA: 0
DIFFICULTY WITH CONCENTRATION: 1
FOCAL WEAKNESS: 0
DECREASED APPETITE: 0
SYNCOPE: 0
NEAR-SYNCOPE: 0
LOSS OF BALANCE: 0
SPUTUM PRODUCTION: 0
HEMATEMESIS: 0
HALLUCINATIONS: 0

## 2025-04-21 NOTE — PATIENT INSTRUCTIONS
Thank you for coming in today. If you have any questions or concerns, you may call the Heart Failure Office at [phone] option [phone], or [phone].  You may also contact our heart failure nursing team via email on [email or web address].    For quicker results set-up your  "[email or web address] account to receive results and other correspondence directly to your phone.    Please bring all your pills/medications to your Cardiology appointments.    **  - No new medication changes today    -We will renew your heart medications today    - Please have the following tests done:  1.Blood tests today (RFP, BNP, CBC)    2. EXERCISE STRESS echocardiogram to evaluate your chest pain, we can schedule this for you today before you leave, or you may CALL  [phone]   OR    [phone] to schedule      - Please make an appointment to be seen in   1-2 months  (VIRTUAL)  January 2026  (IN OFFICE)
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
## 2025-04-22 ENCOUNTER — PHARMACY VISIT (OUTPATIENT)
Dept: PHARMACY | Facility: CLINIC | Age: 75
End: 2025-04-22
Payer: MEDICARE

## 2025-04-22 ENCOUNTER — HOSPITAL ENCOUNTER (OUTPATIENT)
Dept: CARDIOLOGY | Facility: CLINIC | Age: 75
Discharge: HOME | End: 2025-04-22
Payer: MEDICARE

## 2025-04-22 DIAGNOSIS — I47.29 OTHER VENTRICULAR TACHYCARDIA: ICD-10-CM

## 2025-04-22 DIAGNOSIS — Z95.810 PRESENCE OF AUTOMATIC (IMPLANTABLE) CARDIAC DEFIBRILLATOR: ICD-10-CM

## 2025-04-22 PROCEDURE — 93296 REM INTERROG EVL PM/IDS: CPT

## 2025-04-22 PROCEDURE — 93295 DEV INTERROG REMOTE 1/2/MLT: CPT | Performed by: INTERNAL MEDICINE

## 2025-04-23 PROCEDURE — RXMED WILLOW AMBULATORY MEDICATION CHARGE

## 2025-04-24 ENCOUNTER — APPOINTMENT (OUTPATIENT)
Dept: RADIOLOGY | Facility: HOSPITAL | Age: 75
End: 2025-04-24
Payer: MEDICARE

## 2025-04-24 ENCOUNTER — CLINICAL SUPPORT (OUTPATIENT)
Dept: EMERGENCY MEDICINE | Facility: HOSPITAL | Age: 75
End: 2025-04-24
Payer: MEDICARE

## 2025-04-24 ENCOUNTER — HOSPITAL ENCOUNTER (EMERGENCY)
Facility: HOSPITAL | Age: 75
Discharge: HOME | End: 2025-04-24
Attending: EMERGENCY MEDICINE
Payer: MEDICARE

## 2025-04-24 ENCOUNTER — PHARMACY VISIT (OUTPATIENT)
Dept: PHARMACY | Facility: CLINIC | Age: 75
End: 2025-04-24
Payer: MEDICARE

## 2025-04-24 VITALS
SYSTOLIC BLOOD PRESSURE: 183 MMHG | BODY MASS INDEX: 34.06 KG/M2 | OXYGEN SATURATION: 98 % | DIASTOLIC BLOOD PRESSURE: 95 MMHG | RESPIRATION RATE: 18 BRPM | HEIGHT: 67 IN | HEART RATE: 77 BPM | TEMPERATURE: 97.6 F | WEIGHT: 217 LBS

## 2025-04-24 DIAGNOSIS — J18.9 PNEUMONIA OF BOTH LOWER LOBES DUE TO INFECTIOUS ORGANISM: Primary | ICD-10-CM

## 2025-04-24 LAB
ALBUMIN SERPL BCP-MCNC: 4.3 G/DL (ref 3.4–5)
ALP SERPL-CCNC: 71 U/L (ref 33–136)
ALT SERPL W P-5'-P-CCNC: 28 U/L (ref 10–52)
ANION GAP SERPL CALC-SCNC: 14 MMOL/L (ref 10–20)
AST SERPL W P-5'-P-CCNC: 24 U/L (ref 9–39)
BASOPHILS # BLD AUTO: 0.02 X10*3/UL (ref 0–0.1)
BASOPHILS NFR BLD AUTO: 0.5 %
BILIRUB SERPL-MCNC: 0.5 MG/DL (ref 0–1.2)
BNP SERPL-MCNC: 49 PG/ML (ref 0–99)
BUN SERPL-MCNC: 23 MG/DL (ref 6–23)
CALCIUM SERPL-MCNC: 9.8 MG/DL (ref 8.6–10.6)
CARDIAC TROPONIN I PNL SERPL HS: 16 NG/L (ref 0–53)
CARDIAC TROPONIN I PNL SERPL HS: 20 NG/L (ref 0–53)
CHLORIDE SERPL-SCNC: 107 MMOL/L (ref 98–107)
CO2 SERPL-SCNC: 22 MMOL/L (ref 21–32)
CREAT SERPL-MCNC: 1.56 MG/DL (ref 0.5–1.3)
EGFRCR SERPLBLD CKD-EPI 2021: 46 ML/MIN/1.73M*2
EOSINOPHIL # BLD AUTO: 0.11 X10*3/UL (ref 0–0.4)
EOSINOPHIL NFR BLD AUTO: 2.7 %
ERYTHROCYTE [DISTWIDTH] IN BLOOD BY AUTOMATED COUNT: 13.8 % (ref 11.5–14.5)
FLUAV RNA RESP QL NAA+PROBE: NOT DETECTED
FLUBV RNA RESP QL NAA+PROBE: NOT DETECTED
GLUCOSE SERPL-MCNC: 87 MG/DL (ref 74–99)
HCT VFR BLD AUTO: 53.4 % (ref 41–52)
HGB BLD-MCNC: 17.9 G/DL (ref 13.5–17.5)
IMM GRANULOCYTES # BLD AUTO: 0.01 X10*3/UL (ref 0–0.5)
IMM GRANULOCYTES NFR BLD AUTO: 0.2 % (ref 0–0.9)
LYMPHOCYTES # BLD AUTO: 1.99 X10*3/UL (ref 0.8–3)
LYMPHOCYTES NFR BLD AUTO: 48.5 %
MCH RBC QN AUTO: 27.2 PG (ref 26–34)
MCHC RBC AUTO-ENTMCNC: 33.5 G/DL (ref 32–36)
MCV RBC AUTO: 81 FL (ref 80–100)
MONOCYTES # BLD AUTO: 0.42 X10*3/UL (ref 0.05–0.8)
MONOCYTES NFR BLD AUTO: 10.2 %
NEUTROPHILS # BLD AUTO: 1.55 X10*3/UL (ref 1.6–5.5)
NEUTROPHILS NFR BLD AUTO: 37.9 %
NRBC BLD-RTO: 0 /100 WBCS (ref 0–0)
PLATELET # BLD AUTO: 232 X10*3/UL (ref 150–450)
POTASSIUM SERPL-SCNC: 4.4 MMOL/L (ref 3.5–5.3)
PROT SERPL-MCNC: 7.4 G/DL (ref 6.4–8.2)
RBC # BLD AUTO: 6.57 X10*6/UL (ref 4.5–5.9)
SARS-COV-2 RNA RESP QL NAA+PROBE: NOT DETECTED
SODIUM SERPL-SCNC: 139 MMOL/L (ref 136–145)
WBC # BLD AUTO: 4.1 X10*3/UL (ref 4.4–11.3)

## 2025-04-24 PROCEDURE — 84484 ASSAY OF TROPONIN QUANT: CPT | Performed by: EMERGENCY MEDICINE

## 2025-04-24 PROCEDURE — 93005 ELECTROCARDIOGRAM TRACING: CPT

## 2025-04-24 PROCEDURE — 80053 COMPREHEN METABOLIC PANEL: CPT | Performed by: EMERGENCY MEDICINE

## 2025-04-24 PROCEDURE — 36415 COLL VENOUS BLD VENIPUNCTURE: CPT | Performed by: EMERGENCY MEDICINE

## 2025-04-24 PROCEDURE — 87636 SARSCOV2 & INF A&B AMP PRB: CPT

## 2025-04-24 PROCEDURE — 83880 ASSAY OF NATRIURETIC PEPTIDE: CPT | Performed by: EMERGENCY MEDICINE

## 2025-04-24 PROCEDURE — 71045 X-RAY EXAM CHEST 1 VIEW: CPT

## 2025-04-24 PROCEDURE — 99285 EMERGENCY DEPT VISIT HI MDM: CPT | Performed by: EMERGENCY MEDICINE

## 2025-04-24 PROCEDURE — 71046 X-RAY EXAM CHEST 2 VIEWS: CPT

## 2025-04-24 PROCEDURE — 71046 X-RAY EXAM CHEST 2 VIEWS: CPT | Performed by: RADIOLOGY

## 2025-04-24 PROCEDURE — 2500000001 HC RX 250 WO HCPCS SELF ADMINISTERED DRUGS (ALT 637 FOR MEDICARE OP)

## 2025-04-24 PROCEDURE — 71045 X-RAY EXAM CHEST 1 VIEW: CPT | Performed by: STUDENT IN AN ORGANIZED HEALTH CARE EDUCATION/TRAINING PROGRAM

## 2025-04-24 PROCEDURE — 85025 COMPLETE CBC W/AUTO DIFF WBC: CPT | Performed by: EMERGENCY MEDICINE

## 2025-04-24 PROCEDURE — 99285 EMERGENCY DEPT VISIT HI MDM: CPT | Mod: 25 | Performed by: EMERGENCY MEDICINE

## 2025-04-24 RX ORDER — AMOXICILLIN AND CLAVULANATE POTASSIUM 875; 125 MG/1; MG/1
1 TABLET, FILM COATED ORAL ONCE
Status: COMPLETED | OUTPATIENT
Start: 2025-04-24 | End: 2025-04-24

## 2025-04-24 RX ORDER — AZITHROMYCIN 500 MG/1
500 TABLET, FILM COATED ORAL ONCE
Status: COMPLETED | OUTPATIENT
Start: 2025-04-24 | End: 2025-04-24

## 2025-04-24 RX ADMIN — AMOXICILLIN AND CLAVULANATE POTASSIUM 1 TABLET: 875; 125 TABLET, FILM COATED ORAL at 21:49

## 2025-04-24 RX ADMIN — AZITHROMYCIN DIHYDRATE 500 MG: 500 TABLET, FILM COATED ORAL at 21:49

## 2025-04-24 ASSESSMENT — PAIN DESCRIPTION - LOCATION: LOCATION: BACK

## 2025-04-24 ASSESSMENT — COLUMBIA-SUICIDE SEVERITY RATING SCALE - C-SSRS
2. HAVE YOU ACTUALLY HAD ANY THOUGHTS OF KILLING YOURSELF?: NO
6. HAVE YOU EVER DONE ANYTHING, STARTED TO DO ANYTHING, OR PREPARED TO DO ANYTHING TO END YOUR LIFE?: NO
1. IN THE PAST MONTH, HAVE YOU WISHED YOU WERE DEAD OR WISHED YOU COULD GO TO SLEEP AND NOT WAKE UP?: NO

## 2025-04-24 ASSESSMENT — PAIN SCALES - GENERAL
PAINLEVEL_OUTOF10: 8
PAINLEVEL_OUTOF10: 5 - MODERATE PAIN

## 2025-04-24 ASSESSMENT — PAIN - FUNCTIONAL ASSESSMENT: PAIN_FUNCTIONAL_ASSESSMENT: 0-10

## 2025-04-24 NOTE — ED TRIAGE NOTES
C/o R sided rib pain to lower back x 1 week. Patient states pain is worse with deep breathing.    Infliximab Counseling:  I discussed with the patient the risks of infliximab including but not limited to myelosuppression, immunosuppression, autoimmune hepatitis, demyelinating diseases, lymphoma, and serious infections.  The patient understands that monitoring is required including a PPD at baseline and must alert us or the primary physician if symptoms of infection or other concerning signs are noted.

## 2025-04-25 ENCOUNTER — PHARMACY VISIT (OUTPATIENT)
Dept: PHARMACY | Facility: CLINIC | Age: 75
End: 2025-04-25
Payer: MEDICARE

## 2025-04-25 LAB
ATRIAL RATE: 66 BPM
P AXIS: 24 DEGREES
P OFFSET: 176 MS
P ONSET: 112 MS
PR INTERVAL: 200 MS
Q ONSET: 212 MS
QRS COUNT: 11 BEATS
QRS DURATION: 166 MS
QT INTERVAL: 416 MS
QTC CALCULATION(BAZETT): 436 MS
QTC FREDERICIA: 429 MS
R AXIS: -55 DEGREES
T AXIS: -13 DEGREES
T OFFSET: 420 MS
VENTRICULAR RATE: 66 BPM

## 2025-04-25 PROCEDURE — RXMED WILLOW AMBULATORY MEDICATION CHARGE

## 2025-04-25 RX ORDER — AZITHROMYCIN 250 MG/1
TABLET, FILM COATED ORAL
Qty: 6 TABLET | Refills: 0 | Status: SHIPPED | OUTPATIENT
Start: 2025-04-25 | End: 2025-04-30

## 2025-04-25 RX ORDER — AMOXICILLIN AND CLAVULANATE POTASSIUM 875; 125 MG/1; MG/1
1 TABLET, FILM COATED ORAL EVERY 12 HOURS
Qty: 14 TABLET | Refills: 0 | Status: SHIPPED | OUTPATIENT
Start: 2025-04-25 | End: 2025-05-02

## 2025-04-25 NOTE — ED PROVIDER NOTES
History of Present Illness     History provided by: Patient  Limitations to History: None  External Records Reviewed: Outpatient provider notes documenting past medical history    HPI:  Thompson Carranza is a 74 y.o. male with a past medical history pertinent for heart failure with reduced ejection fraction of 25 to 30%, nonischemic cardiomyopathy, diabetes, HIV on El, DNAY, status post AICD placement for sick sinus syndrome, PAD with claudication, history of atrial flutter now on a DOAC after ablation presents to the emergency department for right sided rib and back pain x 1 week.  Patient states that he has had a new developing cough over the past day as well as left lateral and back rib pain.  It is associated with some pleuritic component.  Also when the patient moves his arm or bends to the side.  He describes no new shortness of breath.  He describes no chest pain.  He states no fevers or chills.  No nausea/vomiting/diarrhea.  Patient describes no midline back pain.  No trauma.  No weakness, numbness, tingling, slurred speech, incontinence of bowel or retention of urine.  He states his last CD4 count was greater than 500 and his HIV viral load is undetectable.    Physical Exam   Triage vitals:  T 36.3 °C (97.4 °F)  HR 71  /83  RR 20  O2 97 % None (Room air)    General: Awake, alert, in no acute distress  Eyes: Gaze conjugate.  No scleral icterus or injection  HENT: Normo-cephalic, atraumatic. No stridor  CV: Regular rate, regular rhythm. Radial pulses 2+ bilaterally, 2+ DP and PT pulses bilaterally  Resp: Breathing non-labored, speaking in full sentences.  Crackles at the bilateral bases greater on the right than the left GI: Soft, non-distended, non-tender. No rebound or guarding.  : No suprapubic tenderness or fullness, no CVA tenderness  MSK/Extremities: No gross bony deformities. Moving all extremities, some tenderness to palpation of the right lateral posterior ribs, there is no erythema, no  rash.  The spine in its entirety is not tender, there are no deformities, no erythema, no fluctuance  Skin: Warm. Appropriate color  Neuro: Awake alert and oriented x 4, pupils equal round reactive to light and accommodation, EOMs are intact, normal facial sensation in all dermatomes, tongue protrusion is midline, symmetric head turn, no pronator drift, 5 out of 5 strength in the bilateral upper extremities at the major muscle groups, 5 out of 5 strength at the major muscle groups of the bilateral lower extremities, sensation intact to light touch over all 4 extremities, no ataxia, no dysmetria, no difficulty in ambulation   Psych: Appropriate mood and affect    Medical Decision Making & ED Course   Medical Decision Makin y.o. male hemodynamically stable presents the emergency department for evaluation of back pain.  Patient does have a history of HIV and immunosuppression however his CD4 count and viral load are appropriate and I have no suspicion for acute spinal pathology.  No concern for trauma.  Patient without shortness of breath and with a cough and findings on his chest x-ray concerning for pneumonia.  No concern for pulmonary embolism in this patient.  Patient at no point had chest discomfort however had 2 negative troponins, no concern for ACS in this patient.  EKG separately documented.  Low concern for heart failure given lack of shortness of breath and a negative BNP.  Ultimately his lab work is largely consistent with his baseline.  In shared decision making we did discharge the patient with Augmentin and Zithromax.  He was given prescriptions for the same.  He was given return precautions and discharged.  ----         Social Determinants of Health which Significantly Impact Care: None identified       Chronic conditions affecting the patient's care: See HPI    The patient was discussed with the following consultants/services: Please see ED course for consult transcript        ED Course:  ED  Course as of 04/24/25 2215   Thu Apr 24, 2025 2214 Patient's EKG shows a normal sinus rhythm of 66 bpm, findings consistent with an atrial pacemaker, stable from prior, negative for Sgarbossa, negative for TONJA [GA]      ED Course User Index  [GA] Freedom Boyd MD         Diagnoses as of 04/24/25 2215   Pneumonia of both lower lobes due to infectious organism     Disposition   As a result of the work-up, the patient was discharged home.  he was informed of his diagnosis and instructed to come back with any concerns or worsening of condition.  he and was agreeable to the plan as discussed above.  he was given the opportunity to ask questions.  All of the patient's questions were answered.    Procedures   Procedures    Patient was seen and discussed with the attending of record.    Freedom Boyd MD  Emergency Medicine     Freedom Boyd MD  Resident  04/24/25 2215

## 2025-04-25 NOTE — DISCHARGE INSTRUCTIONS
You have been evaluated in the Emergency Department today for back and side pain. Your evaluation, including chest x-ray, lab work, suggests that your symptoms are due to developing pneumonia.  Your symptoms and chest x-ray are consistent with a pneumonia for which we have prescribed you 2 antibiotics as well as given you your first doses here.  Please follow-up with your primary care physician.    Please follow up with your primary care physician within two days. If you do not have a primary care doctor you may call [phone] to make an appointment.    Return to the Emergency Department if you experience shortness of breath, chest discomfort, weakness, numbness, tingling, fevers, chills. Return to the Emergency Department if you develop any new or worsening symptoms.   Thank you for choosing us for your care.

## 2025-04-28 PROCEDURE — RXMED WILLOW AMBULATORY MEDICATION CHARGE

## 2025-04-30 PROCEDURE — RXMED WILLOW AMBULATORY MEDICATION CHARGE

## 2025-05-01 ENCOUNTER — CLINICAL SUPPORT (OUTPATIENT)
Dept: AUDIOLOGY | Facility: CLINIC | Age: 75
End: 2025-05-01
Payer: MEDICARE

## 2025-05-01 DIAGNOSIS — H90.3 SENSORINEURAL HEARING LOSS (SNHL) OF BOTH EARS: Primary | ICD-10-CM

## 2025-05-01 DIAGNOSIS — H91.93 HEARING IMPAIRED PERSON, BILATERAL: ICD-10-CM

## 2025-05-01 PROCEDURE — 92557 COMPREHENSIVE HEARING TEST: CPT | Performed by: AUDIOLOGIST

## 2025-05-01 PROCEDURE — 92567 TYMPANOMETRY: CPT | Performed by: AUDIOLOGIST

## 2025-05-01 NOTE — PROGRESS NOTES
Chief Complaint   Patient presents with    Hearing Loss       HISTORY:  Thompson Carranza, age 74 years, was seen for audiogram on 5/1/2025 at the referral of Dr. Mendez.  Mr. Carranza presents with history of bilateral sensorineural hearing loss.  He was last tested 3/14/2022.  He purchased hearing aids through a private practice that was in network with his [employer] insurance soon after the audiogram.  He notes he lost the hearing aids about one year ago.  He is encouraged to contact the hearing aid practice as the aids may be under warranty and a replacement set could be ordered under the loss and damage warranty.  Mr. Carranza denies a change in hearing levels.  He denies ear pain, ear pressure, middle ear pathology, ear drainage, ear surgery and tinnitus.    RESULTS:  Prior to testing both external auditory canals were clear and tympanic membranes visualized    Immittance and acoustic reflexes:  Immittance testing yielded TYPE A tympanograms indicating normal middle ear function both ear  Acoustic reflexes were not tested    Audiogram:  Normal hearing levels were obtained 125 - 1000 Hz with a mild to moderate sensorineural hearing loss 1500 - 8000 Hz both ears  Speech reception thresholds obtained at 30 dBHL both ears  Speech discrimination scores were 96% at 70 dBHL    IMPRESSIONS:  Normal middle ear function noted both ears  Mild to moderate sensorineural hearing loss 1500 - 8000 Hz   Slight decrease in hearing in comparison to 2022 results    RECOMMENDATIONS:  1.  Follow up with referring provider  2.  Retest hearing levels annually  3.  Contact your hearing aid practice to inquire about hearing aid replacement, hearing aid warranty.  New hearing aids may need to be ordered through [employer].  This would be completed through contracted hearing aid vendor.    Time: 4418 - 4380

## 2025-05-02 ENCOUNTER — PHARMACY VISIT (OUTPATIENT)
Dept: PHARMACY | Facility: CLINIC | Age: 75
End: 2025-05-02
Payer: MEDICARE

## 2025-05-05 PROCEDURE — RXMED WILLOW AMBULATORY MEDICATION CHARGE

## 2025-05-06 ENCOUNTER — APPOINTMENT (OUTPATIENT)
Dept: ENDOCRINOLOGY | Facility: CLINIC | Age: 75
End: 2025-05-06
Payer: MEDICARE

## 2025-05-06 VITALS
DIASTOLIC BLOOD PRESSURE: 64 MMHG | HEIGHT: 67 IN | SYSTOLIC BLOOD PRESSURE: 122 MMHG | RESPIRATION RATE: 16 BRPM | WEIGHT: 216.6 LBS | BODY MASS INDEX: 34 KG/M2 | HEART RATE: 75 BPM

## 2025-05-06 DIAGNOSIS — E78.5 HYPERLIPIDEMIA, UNSPECIFIED HYPERLIPIDEMIA TYPE: ICD-10-CM

## 2025-05-06 DIAGNOSIS — Z79.4 TYPE 2 DIABETES MELLITUS WITHOUT COMPLICATION, WITH LONG-TERM CURRENT USE OF INSULIN: ICD-10-CM

## 2025-05-06 DIAGNOSIS — E11.65 INADEQUATELY CONTROLLED DIABETES MELLITUS (MULTI): Primary | ICD-10-CM

## 2025-05-06 DIAGNOSIS — E11.9 TYPE 2 DIABETES MELLITUS WITHOUT COMPLICATION, WITH LONG-TERM CURRENT USE OF INSULIN: ICD-10-CM

## 2025-05-06 DIAGNOSIS — I10 HYPERTENSION, UNSPECIFIED TYPE: ICD-10-CM

## 2025-05-06 LAB — POC HEMOGLOBIN A1C: 7 % (ref 4.2–6.5)

## 2025-05-06 PROCEDURE — 83036 HEMOGLOBIN GLYCOSYLATED A1C: CPT | Mod: QW | Performed by: INTERNAL MEDICINE

## 2025-05-06 PROCEDURE — 3078F DIAST BP <80 MM HG: CPT | Performed by: INTERNAL MEDICINE

## 2025-05-06 PROCEDURE — 99214 OFFICE O/P EST MOD 30 MIN: CPT | Performed by: INTERNAL MEDICINE

## 2025-05-06 PROCEDURE — 1036F TOBACCO NON-USER: CPT | Performed by: INTERNAL MEDICINE

## 2025-05-06 PROCEDURE — G2211 COMPLEX E/M VISIT ADD ON: HCPCS | Performed by: INTERNAL MEDICINE

## 2025-05-06 PROCEDURE — 3051F HG A1C>EQUAL 7.0%<8.0%: CPT | Performed by: INTERNAL MEDICINE

## 2025-05-06 PROCEDURE — RXMED WILLOW AMBULATORY MEDICATION CHARGE

## 2025-05-06 PROCEDURE — 1160F RVW MEDS BY RX/DR IN RCRD: CPT | Performed by: INTERNAL MEDICINE

## 2025-05-06 PROCEDURE — 3074F SYST BP LT 130 MM HG: CPT | Performed by: INTERNAL MEDICINE

## 2025-05-06 PROCEDURE — 1159F MED LIST DOCD IN RCRD: CPT | Performed by: INTERNAL MEDICINE

## 2025-05-06 PROCEDURE — 3048F LDL-C <100 MG/DL: CPT | Performed by: INTERNAL MEDICINE

## 2025-05-06 RX ORDER — INSULIN GLARGINE 100 [IU]/ML
INJECTION, SOLUTION SUBCUTANEOUS
Qty: 45 ML | Refills: 3 | Status: SHIPPED | OUTPATIENT
Start: 2025-05-06

## 2025-05-06 RX ORDER — BLOOD-GLUCOSE SENSOR
EACH MISCELLANEOUS
Qty: 6 EACH | Refills: 3 | Status: SHIPPED | OUTPATIENT
Start: 2025-05-06

## 2025-05-06 ASSESSMENT — ENCOUNTER SYMPTOMS
CHILLS: 0
SHORTNESS OF BREATH: 0
VOMITING: 0
FEVER: 0
LIGHT-HEADEDNESS: 0
DIZZINESS: 0
NAUSEA: 0
DIARRHEA: 0
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
## 2025-05-06 NOTE — ASSESSMENT & PLAN NOTE
Orders:    Basaglar KwikPen U-100 Insulin 100 unit/mL (3 mL) pen; INJECT 18 UNITS UNDER THE SKIN IN THE MORNING AND INJECT 26 UNITS IN THE EVENING.

## 2025-05-12 ENCOUNTER — PHARMACY VISIT (OUTPATIENT)
Dept: PHARMACY | Facility: CLINIC | Age: 75
End: 2025-05-12
Payer: MEDICARE

## 2025-05-12 PROCEDURE — RXMED WILLOW AMBULATORY MEDICATION CHARGE

## 2025-05-15 ENCOUNTER — TELEPHONE (OUTPATIENT)
Dept: CARDIOLOGY | Facility: HOSPITAL | Age: 75
End: 2025-05-15
Payer: MEDICARE

## 2025-05-15 NOTE — TELEPHONE ENCOUNTER
Instructions for exercise stress, exercise stress echo, dobutamine stress echo:    Spoke to: Left message for Thompson Carranza   Test: Stress Echo    Please arrive 15 minutes early   The test takes about 1 hour to complete;   Wear comfortable clothing and if you are exercising wear appropriate comfortable shoes  No food 4 hours before your test, water is ok  No caffeine the day of your test  Please do not take your beta blocker, metoprolol , the morning of your test, unless instructed otherwise  Dr. Mistry patients- always take beta blocker  Cardiac rehab patients- always take beta blocker  7. Diabetic patients- hold oral medication,  ; only take 1/2 insulin dose,    8. You may take all your other medications, but we recommend you hold any water pills  9. Please do not use nicotine replacement or smoke 4 hours prior to test,

## 2025-05-16 ENCOUNTER — ANCILLARY PROCEDURE (OUTPATIENT)
Dept: CARDIOLOGY | Facility: CLINIC | Age: 75
End: 2025-05-16
Payer: MEDICARE

## 2025-05-16 DIAGNOSIS — I50.22 HEART FAILURE, SYSTOLIC, CHRONIC: ICD-10-CM

## 2025-05-18 DIAGNOSIS — I50.22 HEART FAILURE, SYSTOLIC, CHRONIC: ICD-10-CM

## 2025-05-19 ENCOUNTER — PHARMACY VISIT (OUTPATIENT)
Dept: PHARMACY | Facility: CLINIC | Age: 75
End: 2025-05-19
Payer: MEDICARE

## 2025-05-19 PROCEDURE — RXMED WILLOW AMBULATORY MEDICATION CHARGE

## 2025-05-19 RX ORDER — EZETIMIBE 10 MG/1
10 TABLET ORAL NIGHTLY
Qty: 90 TABLET | Refills: 3 | Status: SHIPPED | OUTPATIENT
Start: 2025-05-19 | End: 2026-05-14

## 2025-05-22 ENCOUNTER — OFFICE VISIT (OUTPATIENT)
Dept: PRIMARY CARE | Facility: CLINIC | Age: 75
End: 2025-05-22
Payer: MEDICARE

## 2025-05-22 VITALS
HEART RATE: 78 BPM | SYSTOLIC BLOOD PRESSURE: 132 MMHG | WEIGHT: 215 LBS | TEMPERATURE: 97.2 F | BODY MASS INDEX: 33.67 KG/M2 | DIASTOLIC BLOOD PRESSURE: 78 MMHG | OXYGEN SATURATION: 95 %

## 2025-05-22 DIAGNOSIS — Z79.4 LONG TERM (CURRENT) USE OF INSULIN (MULTI): ICD-10-CM

## 2025-05-22 DIAGNOSIS — I48.91 ATRIAL FIBRILLATION, UNSPECIFIED TYPE (MULTI): Primary | ICD-10-CM

## 2025-05-22 DIAGNOSIS — Z79.4 TYPE 2 DIABETES MELLITUS WITH STAGE 3A CHRONIC KIDNEY DISEASE, WITH LONG-TERM CURRENT USE OF INSULIN (MULTI): ICD-10-CM

## 2025-05-22 DIAGNOSIS — I50.41 ACUTE COMBINED SYSTOLIC AND DIASTOLIC CONGESTIVE HEART FAILURE: ICD-10-CM

## 2025-05-22 DIAGNOSIS — E11.22 TYPE 2 DIABETES MELLITUS WITH STAGE 3A CHRONIC KIDNEY DISEASE, WITH LONG-TERM CURRENT USE OF INSULIN (MULTI): ICD-10-CM

## 2025-05-22 DIAGNOSIS — N18.31 TYPE 2 DIABETES MELLITUS WITH STAGE 3A CHRONIC KIDNEY DISEASE, WITH LONG-TERM CURRENT USE OF INSULIN (MULTI): ICD-10-CM

## 2025-05-22 DIAGNOSIS — I25.10 ARTERIOSCLEROSIS OF CORONARY ARTERY: ICD-10-CM

## 2025-05-22 DIAGNOSIS — E78.2 MIXED HYPERLIPIDEMIA: ICD-10-CM

## 2025-05-22 PROCEDURE — 1126F AMNT PAIN NOTED NONE PRSNT: CPT | Performed by: INTERNAL MEDICINE

## 2025-05-22 PROCEDURE — 3048F LDL-C <100 MG/DL: CPT | Performed by: INTERNAL MEDICINE

## 2025-05-22 PROCEDURE — RXMED WILLOW AMBULATORY MEDICATION CHARGE

## 2025-05-22 PROCEDURE — 1160F RVW MEDS BY RX/DR IN RCRD: CPT | Performed by: INTERNAL MEDICINE

## 2025-05-22 PROCEDURE — 3051F HG A1C>EQUAL 7.0%<8.0%: CPT | Performed by: INTERNAL MEDICINE

## 2025-05-22 PROCEDURE — 1036F TOBACCO NON-USER: CPT | Performed by: INTERNAL MEDICINE

## 2025-05-22 PROCEDURE — 3078F DIAST BP <80 MM HG: CPT | Performed by: INTERNAL MEDICINE

## 2025-05-22 PROCEDURE — 99214 OFFICE O/P EST MOD 30 MIN: CPT | Performed by: INTERNAL MEDICINE

## 2025-05-22 PROCEDURE — 3075F SYST BP GE 130 - 139MM HG: CPT | Performed by: INTERNAL MEDICINE

## 2025-05-22 PROCEDURE — 1159F MED LIST DOCD IN RCRD: CPT | Performed by: INTERNAL MEDICINE

## 2025-05-22 ASSESSMENT — ENCOUNTER SYMPTOMS
OCCASIONAL FEELINGS OF UNSTEADINESS: 0
LOSS OF SENSATION IN FEET: 0
DEPRESSION: 0

## 2025-05-22 ASSESSMENT — PAIN SCALES - GENERAL: PAINLEVEL_OUTOF10: 0-NO PAIN

## 2025-05-22 ASSESSMENT — PATIENT HEALTH QUESTIONNAIRE - PHQ9
1. LITTLE INTEREST OR PLEASURE IN DOING THINGS: NOT AT ALL
2. FEELING DOWN, DEPRESSED OR HOPELESS: NOT AT ALL
SUM OF ALL RESPONSES TO PHQ9 QUESTIONS 1 AND 2: 0

## 2025-05-22 NOTE — PROGRESS NOTES
Subjective   Patient ID: Thompson Carranza is a 74 y.o. male who presents for No chief complaint on file..    HPI   74 years old male comes in to see me today as a follow-up because he has pneumonia and was treated for that in April of this year.  His sinuses are still inflamed and infected.  Phlegm coming down from his nostrils sometimes coughing.  He does not think he received enough antibiotic or enough days of treatment.  He saw Dr. Mehta then 2 weeks ago and A1c was 7.0.  He is not losing weight 215 pounds now in March he was 216 pounds.  The following medication he takes for his sugar and they are insulin Basaglar 18 units a day in the morning and 26 units in the evening, Ozempic 2 mg a week injectable.  And Jardiance 10 mg.  No weight loss but his sugar is well-controlled at home and it runs between 98-1 28-1 30.  He is frustrated because he thought he would be losing weight being on Ozempic and Jardiance.  He is still also on prednisone which we decided to discontinue tonight and tomorrow off prednisone for now and come back in 4 weeks June 28 to rule out Cushing syndrome.  Will give him a dexamethasone 1 mg the night before the test and then he will come back next day and we will do the cortisol level in the morning and the dexamethasone level.  The code for cortisol for [facility] is 4212 and the 1 for the dexamethasone level is 05652.  And he absolutely understood that he should not be on prednisone for all this time at least 4 weeks from now.  The rest of the medication reviewed and reconciled.  Review of Systems  12 system review 12 system are negative except for sinus infection with drainage yellow to greenish phlegm with no allergies but he will need also antibiotic since he is short of breath on exertion and at rest and very anxious.  Objective   /78 (BP Location: Left arm, Patient Position: Sitting, BP Cuff Size: Adult)   Pulse 78   Temp 36.2 °C (97.2 °F) (Temporal)   Wt 97.5 kg (215 lb)   SpO2  95%   BMI 33.67 kg/m²     Physical Exam  Alert oriented pleasant cooperative.  Nonicteric sclera no jaundice.  Face symmetrical cranial nerves intact.  Neck supple no masses lymph no thyromegaly or jugular venous distention no carotid bruits.  Lungs clear no rales or wheezing.  Heart normal S1 and S2 regular rhythm.  Abdomen benign hernia present near the umbilicus.  No pain to palpation no organomegaly or masses.  No epigastric or aortic murmur or bruits.  Neurologically intact.  Moves upper and lower extremities with no deficit or weakness.  No sensory deficit.  Skin intact.  Assessment/Plan   Diagnoses and all orders for this visit:  Atrial fibrillation, unspecified type (Multi)  Long term (current) use of insulin (Multi)  Acute combined systolic and diastolic congestive heart failure  Arteriosclerosis of coronary artery  Mixed hyperlipidemia  Type 2 diabetes mellitus with stage 3a chronic kidney disease, with long-term current use of insulin (Multi)

## 2025-05-23 ENCOUNTER — PHARMACY VISIT (OUTPATIENT)
Dept: PHARMACY | Facility: CLINIC | Age: 75
End: 2025-05-23
Payer: MEDICARE

## 2025-05-23 PROCEDURE — RXMED WILLOW AMBULATORY MEDICATION CHARGE

## 2025-05-29 PROCEDURE — RXMED WILLOW AMBULATORY MEDICATION CHARGE

## 2025-05-30 ENCOUNTER — PHARMACY VISIT (OUTPATIENT)
Dept: PHARMACY | Facility: CLINIC | Age: 75
End: 2025-05-30
Payer: MEDICARE

## 2025-05-30 PROCEDURE — RXMED WILLOW AMBULATORY MEDICATION CHARGE

## 2025-06-02 ENCOUNTER — TELEPHONE (OUTPATIENT)
Dept: CARDIOLOGY | Facility: CLINIC | Age: 75
End: 2025-06-02
Payer: MEDICARE

## 2025-06-02 PROCEDURE — RXMED WILLOW AMBULATORY MEDICATION CHARGE

## 2025-06-02 NOTE — TELEPHONE ENCOUNTER
Instructions for exercise stress, exercise stress echo, dobutamine stress echo:    Spoke to: Left message for Thompson to call back for instructions [phone]   Test: Exercise stress echo    Please arrive 15 minutes early to office on [address].   The test takes about 1 hour to complete; 1.5 hours for dobutamine stress echo   Wear comfortable clothing and if you are exercising wear appropriate comfortable shoes  No food 4 hours before your test, water is ok  No caffeine the day of your test  Please do not take your beta blocker, (will instruct when call returned) , the morning of your test, unless instructed otherwise  Dr. Mistry patients- always take beta blocker  Cardiac rehab patients- always take beta blocker  7. Diabetic patients- hold oral medication, (will instructe when call returned) ; only take 1/2 insulin dose, (will instruct when call returned)   8. You may take all your other medications, but we recommend you hold any water pills  9. Please do not use nicotine replacement or smoke 4 hours prior to test, (Will instruct)

## 2025-06-04 ENCOUNTER — PHARMACY VISIT (OUTPATIENT)
Dept: PHARMACY | Facility: CLINIC | Age: 75
End: 2025-06-04
Payer: MEDICARE

## 2025-06-11 DIAGNOSIS — B20 HUMAN IMMUNODEFICIENCY VIRUS (MULTI): ICD-10-CM

## 2025-06-11 PROCEDURE — RXMED WILLOW AMBULATORY MEDICATION CHARGE

## 2025-06-11 RX ORDER — DOLUTEGRAVIR SODIUM 50 MG/1
50 TABLET, FILM COATED ORAL DAILY
Qty: 30 TABLET | Refills: 5 | Status: SHIPPED | OUTPATIENT
Start: 2025-06-11

## 2025-06-11 RX ORDER — EMTRICITABINE AND TENOFOVIR ALAFENAMIDE 200; 25 MG/1; MG/1
1 TABLET ORAL DAILY
Qty: 30 TABLET | Refills: 5 | Status: SHIPPED | OUTPATIENT
Start: 2025-06-11

## 2025-06-17 PROCEDURE — RXMED WILLOW AMBULATORY MEDICATION CHARGE

## 2025-06-18 ENCOUNTER — PHARMACY VISIT (OUTPATIENT)
Dept: PHARMACY | Facility: CLINIC | Age: 75
End: 2025-06-18
Payer: MEDICARE

## 2025-06-20 ENCOUNTER — APPOINTMENT (OUTPATIENT)
Dept: PRIMARY CARE | Facility: CLINIC | Age: 75
End: 2025-06-20
Payer: MEDICARE

## 2025-06-24 PROCEDURE — RXMED WILLOW AMBULATORY MEDICATION CHARGE

## 2025-06-25 ENCOUNTER — TELEMEDICINE (OUTPATIENT)
Dept: CARDIOLOGY | Facility: HOSPITAL | Age: 75
End: 2025-06-25
Payer: MEDICARE

## 2025-06-25 ENCOUNTER — APPOINTMENT (OUTPATIENT)
Dept: CARDIOLOGY | Facility: HOSPITAL | Age: 75
End: 2025-06-25
Payer: MEDICARE

## 2025-06-25 VITALS — WEIGHT: 217 LBS | BODY MASS INDEX: 33.99 KG/M2

## 2025-06-25 DIAGNOSIS — I50.41 ACUTE COMBINED SYSTOLIC AND DIASTOLIC CONGESTIVE HEART FAILURE: Primary | ICD-10-CM

## 2025-06-25 PROCEDURE — 3051F HG A1C>EQUAL 7.0%<8.0%: CPT | Performed by: STUDENT IN AN ORGANIZED HEALTH CARE EDUCATION/TRAINING PROGRAM

## 2025-06-25 PROCEDURE — 1159F MED LIST DOCD IN RCRD: CPT | Performed by: STUDENT IN AN ORGANIZED HEALTH CARE EDUCATION/TRAINING PROGRAM

## 2025-06-25 PROCEDURE — 99214 OFFICE O/P EST MOD 30 MIN: CPT | Performed by: STUDENT IN AN ORGANIZED HEALTH CARE EDUCATION/TRAINING PROGRAM

## 2025-06-25 PROCEDURE — 3048F LDL-C <100 MG/DL: CPT | Performed by: STUDENT IN AN ORGANIZED HEALTH CARE EDUCATION/TRAINING PROGRAM

## 2025-06-25 ASSESSMENT — ENCOUNTER SYMPTOMS
COUGH: 0
SHORTNESS OF BREATH: 0
DYSPNEA ON EXERTION: 0
DIFFICULTY WITH CONCENTRATION: 1
FEVER: 0
SPUTUM PRODUCTION: 0
HALLUCINATIONS: 0
CLAUDICATION: 0
ALTERED MENTAL STATUS: 0
HEMATEMESIS: 0
PND: 0
WEIGHT LOSS: 0
NEAR-SYNCOPE: 0
WHEEZING: 0
ORTHOPNEA: 0
HEMATURIA: 0
WEIGHT GAIN: 0
LOSS OF BALANCE: 0
HEMATOCHEZIA: 0
WEAKNESS: 0
FOCAL WEAKNESS: 0
IRREGULAR HEARTBEAT: 0
SYNCOPE: 0
ABDOMINAL PAIN: 0
PALPITATIONS: 0
DECREASED APPETITE: 0
COLOR CHANGE: 0

## 2025-06-25 NOTE — PATIENT INSTRUCTIONS
- Cancel stress echo( touch base with echo department)  - In person appointment at [facility] 10/2025
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
## 2025-06-26 ENCOUNTER — PHARMACY VISIT (OUTPATIENT)
Dept: PHARMACY | Facility: CLINIC | Age: 75
End: 2025-06-26
Payer: MEDICARE

## 2025-06-30 ENCOUNTER — PHARMACY VISIT (OUTPATIENT)
Dept: PHARMACY | Facility: CLINIC | Age: 75
End: 2025-06-30
Payer: MEDICARE

## 2025-06-30 PROCEDURE — RXMED WILLOW AMBULATORY MEDICATION CHARGE

## 2025-07-01 ENCOUNTER — APPOINTMENT (OUTPATIENT)
Dept: CARDIOLOGY | Facility: HOSPITAL | Age: 75
End: 2025-07-01
Payer: MEDICARE

## 2025-07-14 PROCEDURE — RXMED WILLOW AMBULATORY MEDICATION CHARGE

## 2025-07-15 PROCEDURE — RXMED WILLOW AMBULATORY MEDICATION CHARGE

## 2025-07-18 ENCOUNTER — APPOINTMENT (OUTPATIENT)
Dept: PRIMARY CARE | Facility: CLINIC | Age: 75
End: 2025-07-18
Payer: MEDICARE

## 2025-07-18 DIAGNOSIS — E11.65 INADEQUATELY CONTROLLED DIABETES MELLITUS (MULTI): ICD-10-CM

## 2025-07-18 RX ORDER — SEMAGLUTIDE 1.34 MG/ML
1 INJECTION, SOLUTION SUBCUTANEOUS
Qty: 9 ML | Refills: 1 | Status: SHIPPED | OUTPATIENT
Start: 2025-07-18 | End: 2026-07-18

## 2025-07-19 ENCOUNTER — PHARMACY VISIT (OUTPATIENT)
Dept: PHARMACY | Facility: CLINIC | Age: 75
End: 2025-07-19
Payer: MEDICARE

## 2025-07-19 PROCEDURE — RXMED WILLOW AMBULATORY MEDICATION CHARGE

## 2025-07-22 PROCEDURE — RXMED WILLOW AMBULATORY MEDICATION CHARGE

## 2025-07-24 ENCOUNTER — PHARMACY VISIT (OUTPATIENT)
Dept: PHARMACY | Facility: CLINIC | Age: 75
End: 2025-07-24
Payer: MEDICARE

## 2025-07-24 PROCEDURE — RXMED WILLOW AMBULATORY MEDICATION CHARGE

## 2025-07-25 ENCOUNTER — APPOINTMENT (OUTPATIENT)
Dept: CARDIOLOGY | Facility: HOSPITAL | Age: 75
End: 2025-07-25
Payer: MEDICARE

## 2025-07-25 ENCOUNTER — APPOINTMENT (OUTPATIENT)
Dept: OPHTHALMOLOGY | Facility: CLINIC | Age: 75
End: 2025-07-25
Payer: MEDICARE

## 2025-07-25 DIAGNOSIS — H25.813 COMBINED FORMS OF AGE-RELATED CATARACT, BILATERAL: ICD-10-CM

## 2025-07-25 DIAGNOSIS — E11.9 CONTROLLED TYPE 2 DIABETES MELLITUS WITHOUT COMPLICATION, UNSPECIFIED WHETHER LONG TERM INSULIN USE: Primary | ICD-10-CM

## 2025-07-25 PROCEDURE — 99213 OFFICE O/P EST LOW 20 MIN: CPT | Performed by: OPHTHALMOLOGY

## 2025-07-25 ASSESSMENT — REFRACTION_MANIFEST
OD_SPHERE: -1.25
OS_ADD: +2.75
OS_SPHERE: -1.50
OD_ADD: +2.75
OD_CYLINDER: -1.50
OD_AXIS: 180
OS_AXIS: 180
OS_CYLINDER: -0.50

## 2025-07-25 ASSESSMENT — REFRACTION_WEARINGRX
OS_CYLINDER: -0.50
OS_ADD: +2.75
OD_ADD: +2.75
OS_AXIS: 180
OD_AXIS: 180
OD_SPHERE: -1.25
OD_CYLINDER: -1.25
OS_SPHERE: -1.25

## 2025-07-25 ASSESSMENT — SLIT LAMP EXAM - LIDS
COMMENTS: GOOD POSITION
COMMENTS: GOOD POSITION

## 2025-07-25 ASSESSMENT — CONF VISUAL FIELD
OD_INFERIOR_TEMPORAL_RESTRICTION: 0
OD_SUPERIOR_NASAL_RESTRICTION: 0
OS_INFERIOR_TEMPORAL_RESTRICTION: 0
OS_SUPERIOR_TEMPORAL_RESTRICTION: 0
OD_INFERIOR_NASAL_RESTRICTION: 0
OS_SUPERIOR_NASAL_RESTRICTION: 0
OD_NORMAL: 1
OD_SUPERIOR_TEMPORAL_RESTRICTION: 0
OS_INFERIOR_NASAL_RESTRICTION: 0
OS_NORMAL: 1

## 2025-07-25 ASSESSMENT — VISUAL ACUITY
OD_BAT_MED: 20/100
METHOD: SNELLEN - LINEAR
OD_CC: 20/25-2
OS_BAT_MED: 20/200
OS_CC: 20/25-2
CORRECTION_TYPE: GLASSES

## 2025-07-25 ASSESSMENT — CUP TO DISC RATIO
OD_RATIO: .4
OS_RATIO: .4

## 2025-07-25 ASSESSMENT — EXTERNAL EXAM - RIGHT EYE: OD_EXAM: NORMAL

## 2025-07-25 ASSESSMENT — EXTERNAL EXAM - LEFT EYE: OS_EXAM: NORMAL

## 2025-07-25 NOTE — PROGRESS NOTES
Assessment/Plan   Diagnoses and all orders for this visit:  Controlled type 2 diabetes mellitus without complication, unspecified whether long term insulin use  no retinopathy observed on exam today od/os, pt ed to continue good BGlc, blood pressure and lipid control, rtc with any changes in vision, otherwise monitor 1 year   Combined forms of age-related cataract, bilateral  Significant glare and unable to drive at night  Discussed cataract surgery, pt defers  Get mac oct and lenstar next visit

## 2025-07-28 PROCEDURE — RXMED WILLOW AMBULATORY MEDICATION CHARGE

## 2025-08-04 ENCOUNTER — PHARMACY VISIT (OUTPATIENT)
Dept: PHARMACY | Facility: CLINIC | Age: 75
End: 2025-08-04
Payer: MEDICARE

## 2025-08-16 PROCEDURE — RXMED WILLOW AMBULATORY MEDICATION CHARGE

## 2025-08-19 DIAGNOSIS — E11.65 INADEQUATELY CONTROLLED DIABETES MELLITUS (MULTI): Primary | ICD-10-CM

## 2025-08-19 PROCEDURE — RXMED WILLOW AMBULATORY MEDICATION CHARGE

## 2025-08-19 RX ORDER — INSULIN GLARGINE 100 [IU]/ML
18 INJECTION, SOLUTION SUBCUTANEOUS DAILY
Qty: 45 ML | Refills: 3 | Status: SHIPPED | OUTPATIENT
Start: 2025-08-19 | End: 2026-08-19

## 2025-08-20 ENCOUNTER — PHARMACY VISIT (OUTPATIENT)
Dept: PHARMACY | Facility: CLINIC | Age: 75
End: 2025-08-20
Payer: MEDICARE

## 2025-08-25 PROCEDURE — RXMED WILLOW AMBULATORY MEDICATION CHARGE

## 2025-08-26 PROCEDURE — RXMED WILLOW AMBULATORY MEDICATION CHARGE

## 2025-08-27 ENCOUNTER — PHARMACY VISIT (OUTPATIENT)
Dept: PHARMACY | Facility: CLINIC | Age: 75
End: 2025-08-27
Payer: MEDICARE

## 2025-09-01 PROCEDURE — RXMED WILLOW AMBULATORY MEDICATION CHARGE

## 2025-09-02 PROCEDURE — RXMED WILLOW AMBULATORY MEDICATION CHARGE

## 2025-09-03 ENCOUNTER — PHARMACY VISIT (OUTPATIENT)
Dept: PHARMACY | Facility: CLINIC | Age: 75
End: 2025-09-03
Payer: MEDICARE

## 2025-09-18 ENCOUNTER — APPOINTMENT (OUTPATIENT)
Dept: RHEUMATOLOGY | Facility: CLINIC | Age: 75
End: 2025-09-18
Payer: MEDICARE

## 2025-09-22 ENCOUNTER — APPOINTMENT (OUTPATIENT)
Dept: UROLOGY | Facility: CLINIC | Age: 75
End: 2025-09-22
Payer: MEDICARE

## 2025-10-07 ENCOUNTER — APPOINTMENT (OUTPATIENT)
Facility: CLINIC | Age: 75
End: 2025-10-07
Payer: MEDICARE

## 2025-12-04 ENCOUNTER — APPOINTMENT (OUTPATIENT)
Dept: PRIMARY CARE | Facility: CLINIC | Age: 75
End: 2025-12-04
Payer: MEDICARE

## 2026-01-19 ENCOUNTER — APPOINTMENT (OUTPATIENT)
Facility: CLINIC | Age: 76
End: 2026-01-19
Payer: MEDICARE

## 2026-07-31 ENCOUNTER — APPOINTMENT (OUTPATIENT)
Dept: OPHTHALMOLOGY | Facility: CLINIC | Age: 76
End: 2026-07-31
Payer: MEDICARE